# Patient Record
Sex: MALE | Race: WHITE | Employment: OTHER | URBAN - METROPOLITAN AREA
[De-identification: names, ages, dates, MRNs, and addresses within clinical notes are randomized per-mention and may not be internally consistent; named-entity substitution may affect disease eponyms.]

---

## 2017-01-03 ENCOUNTER — TELEPHONE (OUTPATIENT)
Dept: FAMILY MEDICINE CLINIC | Age: 82
End: 2017-01-03

## 2017-01-09 RX ORDER — HYDROCODONE BITARTRATE AND ACETAMINOPHEN 5; 325 MG/1; MG/1
1 TABLET ORAL EVERY 6 HOURS PRN
Qty: 120 TABLET | Refills: 0 | Status: SHIPPED | OUTPATIENT
Start: 2017-01-09 | End: 2017-02-14 | Stop reason: SDUPTHER

## 2017-01-27 ENCOUNTER — OFFICE VISIT (OUTPATIENT)
Dept: FAMILY MEDICINE CLINIC | Age: 82
End: 2017-01-27

## 2017-01-27 VITALS
BODY MASS INDEX: 22.04 KG/M2 | WEIGHT: 148.8 LBS | HEART RATE: 76 BPM | HEIGHT: 69 IN | RESPIRATION RATE: 16 BRPM | SYSTOLIC BLOOD PRESSURE: 124 MMHG | DIASTOLIC BLOOD PRESSURE: 76 MMHG

## 2017-01-27 DIAGNOSIS — F32.A ANXIETY AND DEPRESSION: ICD-10-CM

## 2017-01-27 DIAGNOSIS — M51.36 DDD (DEGENERATIVE DISC DISEASE), LUMBAR: ICD-10-CM

## 2017-01-27 DIAGNOSIS — N40.0 BENIGN NON-NODULAR PROSTATIC HYPERPLASIA WITHOUT LOWER URINARY TRACT SYMPTOMS: ICD-10-CM

## 2017-01-27 DIAGNOSIS — G20 PARKINSON'S DISEASE (HCC): Primary | ICD-10-CM

## 2017-01-27 DIAGNOSIS — F41.9 ANXIETY AND DEPRESSION: ICD-10-CM

## 2017-01-27 DIAGNOSIS — R53.1 GENERALIZED WEAKNESS: ICD-10-CM

## 2017-01-27 PROCEDURE — 99214 OFFICE O/P EST MOD 30 MIN: CPT | Performed by: FAMILY MEDICINE

## 2017-01-27 PROCEDURE — G8427 DOCREV CUR MEDS BY ELIG CLIN: HCPCS | Performed by: FAMILY MEDICINE

## 2017-01-27 PROCEDURE — 1036F TOBACCO NON-USER: CPT | Performed by: FAMILY MEDICINE

## 2017-01-27 PROCEDURE — 1123F ACP DISCUSS/DSCN MKR DOCD: CPT | Performed by: FAMILY MEDICINE

## 2017-01-27 PROCEDURE — G8419 CALC BMI OUT NRM PARAM NOF/U: HCPCS | Performed by: FAMILY MEDICINE

## 2017-01-27 PROCEDURE — G8484 FLU IMMUNIZE NO ADMIN: HCPCS | Performed by: FAMILY MEDICINE

## 2017-01-27 PROCEDURE — 4040F PNEUMOC VAC/ADMIN/RCVD: CPT | Performed by: FAMILY MEDICINE

## 2017-01-27 ASSESSMENT — ENCOUNTER SYMPTOMS
RESPIRATORY NEGATIVE: 1
BACK PAIN: 1
GASTROINTESTINAL NEGATIVE: 1

## 2017-01-27 ASSESSMENT — PATIENT HEALTH QUESTIONNAIRE - PHQ9
SUM OF ALL RESPONSES TO PHQ QUESTIONS 1-9: 0
1. LITTLE INTEREST OR PLEASURE IN DOING THINGS: 0
2. FEELING DOWN, DEPRESSED OR HOPELESS: 0
SUM OF ALL RESPONSES TO PHQ9 QUESTIONS 1 & 2: 0

## 2017-02-14 RX ORDER — HYDROCODONE BITARTRATE AND ACETAMINOPHEN 5; 325 MG/1; MG/1
1 TABLET ORAL EVERY 6 HOURS PRN
Qty: 120 TABLET | Refills: 0 | Status: SHIPPED | OUTPATIENT
Start: 2017-02-14 | End: 2017-03-20 | Stop reason: SDUPTHER

## 2017-03-20 RX ORDER — HYDROCODONE BITARTRATE AND ACETAMINOPHEN 5; 325 MG/1; MG/1
1 TABLET ORAL EVERY 6 HOURS PRN
Qty: 120 TABLET | Refills: 0 | Status: SHIPPED | OUTPATIENT
Start: 2017-03-20 | End: 2017-05-03 | Stop reason: SDUPTHER

## 2017-05-03 RX ORDER — HYDROCODONE BITARTRATE AND ACETAMINOPHEN 5; 325 MG/1; MG/1
1 TABLET ORAL EVERY 6 HOURS PRN
Qty: 120 TABLET | Refills: 0 | Status: SHIPPED | OUTPATIENT
Start: 2017-05-03 | End: 2017-07-07 | Stop reason: SDUPTHER

## 2017-05-03 RX ORDER — TRAZODONE HYDROCHLORIDE 100 MG/1
TABLET ORAL
Qty: 30 TABLET | Refills: 4 | Status: SHIPPED | OUTPATIENT
Start: 2017-05-03 | End: 2017-05-16

## 2017-05-15 ENCOUNTER — TELEPHONE (OUTPATIENT)
Dept: FAMILY MEDICINE CLINIC | Age: 82
End: 2017-05-15

## 2017-05-16 ENCOUNTER — OFFICE VISIT (OUTPATIENT)
Dept: FAMILY MEDICINE CLINIC | Age: 82
End: 2017-05-16

## 2017-05-16 VITALS
WEIGHT: 147 LBS | DIASTOLIC BLOOD PRESSURE: 66 MMHG | HEIGHT: 69 IN | HEART RATE: 76 BPM | BODY MASS INDEX: 21.77 KG/M2 | SYSTOLIC BLOOD PRESSURE: 128 MMHG | RESPIRATION RATE: 20 BRPM

## 2017-05-16 DIAGNOSIS — G20 PARKINSON'S DISEASE (HCC): ICD-10-CM

## 2017-05-16 DIAGNOSIS — R11.0 NAUSEA: ICD-10-CM

## 2017-05-16 DIAGNOSIS — T50.905A ADVERSE EFFECTS OF MEDICATION, INITIAL ENCOUNTER: Primary | ICD-10-CM

## 2017-05-16 DIAGNOSIS — F43.23 ADJUSTMENT DISORDER WITH MIXED ANXIETY AND DEPRESSED MOOD: ICD-10-CM

## 2017-05-16 DIAGNOSIS — R53.83 FATIGUE, UNSPECIFIED TYPE: ICD-10-CM

## 2017-05-16 DIAGNOSIS — N40.0 BENIGN NON-NODULAR PROSTATIC HYPERPLASIA, PRESENCE OF LOWER URINARY TRACT SYMPTOMS UNSPECIFIED: ICD-10-CM

## 2017-05-16 DIAGNOSIS — R63.0 DECREASED APPETITE: ICD-10-CM

## 2017-05-16 PROCEDURE — 4040F PNEUMOC VAC/ADMIN/RCVD: CPT | Performed by: FAMILY MEDICINE

## 2017-05-16 PROCEDURE — 99214 OFFICE O/P EST MOD 30 MIN: CPT | Performed by: FAMILY MEDICINE

## 2017-05-16 PROCEDURE — 1123F ACP DISCUSS/DSCN MKR DOCD: CPT | Performed by: FAMILY MEDICINE

## 2017-05-16 PROCEDURE — G8427 DOCREV CUR MEDS BY ELIG CLIN: HCPCS | Performed by: FAMILY MEDICINE

## 2017-05-16 PROCEDURE — 1036F TOBACCO NON-USER: CPT | Performed by: FAMILY MEDICINE

## 2017-05-16 PROCEDURE — G8419 CALC BMI OUT NRM PARAM NOF/U: HCPCS | Performed by: FAMILY MEDICINE

## 2017-05-16 RX ORDER — TRAZODONE HYDROCHLORIDE 50 MG/1
50 TABLET ORAL NIGHTLY
Qty: 30 TABLET | Refills: 5 | Status: SHIPPED | OUTPATIENT
Start: 2017-05-16 | End: 2018-01-09

## 2017-05-16 ASSESSMENT — ENCOUNTER SYMPTOMS: NAUSEA: 1

## 2017-05-25 ENCOUNTER — TELEPHONE (OUTPATIENT)
Dept: FAMILY MEDICINE CLINIC | Age: 82
End: 2017-05-25

## 2017-06-01 ENCOUNTER — CARE COORDINATION (OUTPATIENT)
Dept: FAMILY MEDICINE CLINIC | Age: 82
End: 2017-06-01

## 2017-07-08 RX ORDER — HYDROCODONE BITARTRATE AND ACETAMINOPHEN 5; 325 MG/1; MG/1
1 TABLET ORAL EVERY 6 HOURS PRN
Qty: 120 TABLET | Refills: 0 | Status: SHIPPED | OUTPATIENT
Start: 2017-07-08 | End: 2017-08-16 | Stop reason: SDUPTHER

## 2017-07-24 ENCOUNTER — OFFICE VISIT (OUTPATIENT)
Dept: PHYSICAL MEDICINE AND REHAB | Age: 82
End: 2017-07-24
Payer: MEDICARE

## 2017-07-24 VITALS
SYSTOLIC BLOOD PRESSURE: 125 MMHG | BODY MASS INDEX: 22.64 KG/M2 | HEIGHT: 68 IN | HEART RATE: 77 BPM | WEIGHT: 149.4 LBS | DIASTOLIC BLOOD PRESSURE: 72 MMHG

## 2017-07-24 DIAGNOSIS — G20 PARKINSON'S DISEASE (HCC): Primary | ICD-10-CM

## 2017-07-24 DIAGNOSIS — M54.89 BACK PAIN WITHOUT SCIATICA: ICD-10-CM

## 2017-07-24 PROCEDURE — G8420 CALC BMI NORM PARAMETERS: HCPCS | Performed by: PSYCHIATRY & NEUROLOGY

## 2017-07-24 PROCEDURE — G8427 DOCREV CUR MEDS BY ELIG CLIN: HCPCS | Performed by: PSYCHIATRY & NEUROLOGY

## 2017-07-24 PROCEDURE — 1123F ACP DISCUSS/DSCN MKR DOCD: CPT | Performed by: PSYCHIATRY & NEUROLOGY

## 2017-07-24 PROCEDURE — 4040F PNEUMOC VAC/ADMIN/RCVD: CPT | Performed by: PSYCHIATRY & NEUROLOGY

## 2017-07-24 PROCEDURE — 1036F TOBACCO NON-USER: CPT | Performed by: PSYCHIATRY & NEUROLOGY

## 2017-07-24 PROCEDURE — 99213 OFFICE O/P EST LOW 20 MIN: CPT | Performed by: PSYCHIATRY & NEUROLOGY

## 2017-07-24 RX ORDER — AMANTADINE HYDROCHLORIDE 100 MG/1
100 CAPSULE, GELATIN COATED ORAL 2 TIMES DAILY
Qty: 60 CAPSULE | Refills: 7 | Status: SHIPPED | OUTPATIENT
Start: 2017-07-24

## 2017-07-27 ENCOUNTER — OFFICE VISIT (OUTPATIENT)
Dept: FAMILY MEDICINE CLINIC | Age: 82
End: 2017-07-27
Payer: MEDICARE

## 2017-07-27 VITALS
DIASTOLIC BLOOD PRESSURE: 64 MMHG | WEIGHT: 146.2 LBS | BODY MASS INDEX: 22.16 KG/M2 | HEART RATE: 92 BPM | SYSTOLIC BLOOD PRESSURE: 122 MMHG | RESPIRATION RATE: 20 BRPM | HEIGHT: 68 IN

## 2017-07-27 DIAGNOSIS — F43.20 ADJUSTMENT DISORDER, UNSPECIFIED TYPE: ICD-10-CM

## 2017-07-27 DIAGNOSIS — Z23 NEED FOR VACCINATION WITH 13-POLYVALENT PNEUMOCOCCAL CONJUGATE VACCINE: ICD-10-CM

## 2017-07-27 DIAGNOSIS — G20 PARKINSON'S DISEASE (HCC): Primary | ICD-10-CM

## 2017-07-27 DIAGNOSIS — N40.0 BENIGN NON-NODULAR PROSTATIC HYPERPLASIA, PRESENCE OF LOWER URINARY TRACT SYMPTOMS UNSPECIFIED: ICD-10-CM

## 2017-07-27 DIAGNOSIS — R53.1 GENERALIZED WEAKNESS: ICD-10-CM

## 2017-07-27 DIAGNOSIS — M51.36 DDD (DEGENERATIVE DISC DISEASE), LUMBAR: ICD-10-CM

## 2017-07-27 PROCEDURE — 99214 OFFICE O/P EST MOD 30 MIN: CPT | Performed by: FAMILY MEDICINE

## 2017-07-27 PROCEDURE — G0009 ADMIN PNEUMOCOCCAL VACCINE: HCPCS | Performed by: FAMILY MEDICINE

## 2017-07-27 PROCEDURE — 1036F TOBACCO NON-USER: CPT | Performed by: FAMILY MEDICINE

## 2017-07-27 PROCEDURE — 4040F PNEUMOC VAC/ADMIN/RCVD: CPT | Performed by: FAMILY MEDICINE

## 2017-07-27 PROCEDURE — G8427 DOCREV CUR MEDS BY ELIG CLIN: HCPCS | Performed by: FAMILY MEDICINE

## 2017-07-27 PROCEDURE — G8420 CALC BMI NORM PARAMETERS: HCPCS | Performed by: FAMILY MEDICINE

## 2017-07-27 PROCEDURE — 90670 PCV13 VACCINE IM: CPT | Performed by: FAMILY MEDICINE

## 2017-07-27 PROCEDURE — 1123F ACP DISCUSS/DSCN MKR DOCD: CPT | Performed by: FAMILY MEDICINE

## 2017-07-27 ASSESSMENT — ENCOUNTER SYMPTOMS
GASTROINTESTINAL NEGATIVE: 1
RESPIRATORY NEGATIVE: 1

## 2017-08-07 ENCOUNTER — TELEPHONE (OUTPATIENT)
Dept: FAMILY MEDICINE CLINIC | Age: 82
End: 2017-08-07

## 2017-08-07 DIAGNOSIS — G20 PARKINSON'S DISEASE (HCC): Primary | ICD-10-CM

## 2017-08-16 RX ORDER — HYDROCODONE BITARTRATE AND ACETAMINOPHEN 5; 325 MG/1; MG/1
1 TABLET ORAL EVERY 6 HOURS PRN
Qty: 120 TABLET | Refills: 0 | Status: SHIPPED | OUTPATIENT
Start: 2017-08-16 | End: 2017-09-25 | Stop reason: SDUPTHER

## 2017-09-11 ENCOUNTER — TELEPHONE (OUTPATIENT)
Dept: NEUROLOGY | Age: 82
End: 2017-09-11

## 2017-09-25 ENCOUNTER — OFFICE VISIT (OUTPATIENT)
Dept: NEUROLOGY | Age: 82
End: 2017-09-25
Payer: MEDICARE

## 2017-09-25 VITALS
BODY MASS INDEX: 21.92 KG/M2 | HEART RATE: 66 BPM | DIASTOLIC BLOOD PRESSURE: 66 MMHG | WEIGHT: 148 LBS | SYSTOLIC BLOOD PRESSURE: 138 MMHG | HEIGHT: 69 IN

## 2017-09-25 DIAGNOSIS — G20 PARKINSON'S DISEASE (HCC): Primary | ICD-10-CM

## 2017-09-25 PROCEDURE — 4040F PNEUMOC VAC/ADMIN/RCVD: CPT | Performed by: PSYCHIATRY & NEUROLOGY

## 2017-09-25 PROCEDURE — 1036F TOBACCO NON-USER: CPT | Performed by: PSYCHIATRY & NEUROLOGY

## 2017-09-25 PROCEDURE — 1123F ACP DISCUSS/DSCN MKR DOCD: CPT | Performed by: PSYCHIATRY & NEUROLOGY

## 2017-09-25 PROCEDURE — G8420 CALC BMI NORM PARAMETERS: HCPCS | Performed by: PSYCHIATRY & NEUROLOGY

## 2017-09-25 PROCEDURE — 99213 OFFICE O/P EST LOW 20 MIN: CPT | Performed by: PSYCHIATRY & NEUROLOGY

## 2017-09-25 PROCEDURE — G8427 DOCREV CUR MEDS BY ELIG CLIN: HCPCS | Performed by: PSYCHIATRY & NEUROLOGY

## 2017-09-25 RX ORDER — ALPRAZOLAM 0.25 MG/1
0.25 TABLET ORAL 3 TIMES DAILY PRN
Qty: 90 TABLET | Refills: 5 | Status: ON HOLD | OUTPATIENT
Start: 2017-09-25 | End: 2018-01-24 | Stop reason: HOSPADM

## 2017-09-25 RX ORDER — HYDROCODONE BITARTRATE AND ACETAMINOPHEN 5; 325 MG/1; MG/1
1 TABLET ORAL EVERY 6 HOURS PRN
Qty: 120 TABLET | Refills: 0 | Status: SHIPPED | OUTPATIENT
Start: 2017-09-25 | End: 2017-11-13 | Stop reason: SDUPTHER

## 2017-10-04 RX ORDER — TRAZODONE HYDROCHLORIDE 100 MG/1
TABLET ORAL
Qty: 30 TABLET | Refills: 3 | Status: SHIPPED | OUTPATIENT
Start: 2017-10-04

## 2017-11-03 ENCOUNTER — TELEPHONE (OUTPATIENT)
Dept: FAMILY MEDICINE CLINIC | Age: 82
End: 2017-11-03

## 2017-11-03 RX ORDER — ONDANSETRON 4 MG/1
4 TABLET, FILM COATED ORAL EVERY 8 HOURS PRN
Qty: 30 TABLET | Refills: 5 | Status: SHIPPED | OUTPATIENT
Start: 2017-11-03 | End: 2017-11-08

## 2017-11-03 NOTE — TELEPHONE ENCOUNTER
11/03/2017 Patient wife called office saying patient is very nauseas every day from the side effect of sinemet. Wife asking if Dr. Martita Herrmann would prescribe patient Zophran to help ease the nausea. Patient uses [facility] on [address]. Patient phone # [phone]. da

## 2017-11-08 ENCOUNTER — TELEPHONE (OUTPATIENT)
Dept: FAMILY MEDICINE CLINIC | Age: 82
End: 2017-11-08

## 2017-11-08 RX ORDER — PROMETHAZINE HYDROCHLORIDE 12.5 MG/1
12.5 TABLET ORAL DAILY PRN
Qty: 30 TABLET | Refills: 2 | Status: SHIPPED | OUTPATIENT
Start: 2017-11-08 | End: 2018-01-09 | Stop reason: SDUPTHER

## 2017-11-13 RX ORDER — HYDROCODONE BITARTRATE AND ACETAMINOPHEN 5; 325 MG/1; MG/1
1 TABLET ORAL EVERY 6 HOURS PRN
Qty: 120 TABLET | Refills: 0 | Status: SHIPPED | OUTPATIENT
Start: 2017-11-13 | End: 2017-12-18 | Stop reason: SDUPTHER

## 2017-11-13 NOTE — TELEPHONE ENCOUNTER
Jeramy Barba called requesting a refill on the following medications:  Requested Prescriptions     Pending Prescriptions Disp Refills    HYDROcodone-acetaminophen (NORCO) 5-325 MG per tablet 120 tablet 0     Sig: Take 1 tablet by mouth every 6 hours as needed for Pain .      Pharmacy [facility]      Date of last visit: 7/27/17  Date of next visit (if applicable): 1/46/7974        Date of last fill and quantity (to be completed by clinical staff)  Pharmacy name: [facility]

## 2017-12-15 NOTE — TELEPHONE ENCOUNTER
Recommend call Dr. Alisson Shrestha to see if his Sinemet can be adjusted. This is better option than taking anti-nausea med every day.

## 2017-12-18 RX ORDER — HYDROCODONE BITARTRATE AND ACETAMINOPHEN 5; 325 MG/1; MG/1
1 TABLET ORAL EVERY 6 HOURS PRN
Qty: 120 TABLET | Refills: 0 | Status: SHIPPED | OUTPATIENT
Start: 2017-12-18

## 2017-12-18 NOTE — TELEPHONE ENCOUNTER
12/18/2017   Josi Naik called requesting a refill on the following medications:  Requested Prescriptions     Pending Prescriptions Disp Refills    HYDROcodone-acetaminophen (NORCO) 5-325 MG per tablet 120 tablet 0     Sig: Take 1 tablet by mouth every 6 hours as needed for Pain .      Pharmacy verified:  [facility]      Date of last visit:  07/27/2017  Date of next visit (if applicable): 0/28/9155

## 2018-01-08 ENCOUNTER — TELEPHONE (OUTPATIENT)
Dept: NEUROLOGY | Age: 83
End: 2018-01-08

## 2018-01-09 ENCOUNTER — OFFICE VISIT (OUTPATIENT)
Dept: FAMILY MEDICINE CLINIC | Age: 83
End: 2018-01-09
Payer: MEDICARE

## 2018-01-09 VITALS
WEIGHT: 145 LBS | HEIGHT: 67 IN | DIASTOLIC BLOOD PRESSURE: 60 MMHG | HEART RATE: 80 BPM | BODY MASS INDEX: 22.76 KG/M2 | SYSTOLIC BLOOD PRESSURE: 130 MMHG | RESPIRATION RATE: 14 BRPM

## 2018-01-09 DIAGNOSIS — I87.2 VENOUS INSUFFICIENCY: ICD-10-CM

## 2018-01-09 DIAGNOSIS — R60.0 LOWER LEG EDEMA: Primary | ICD-10-CM

## 2018-01-09 PROCEDURE — 4040F PNEUMOC VAC/ADMIN/RCVD: CPT | Performed by: FAMILY MEDICINE

## 2018-01-09 PROCEDURE — G8484 FLU IMMUNIZE NO ADMIN: HCPCS | Performed by: FAMILY MEDICINE

## 2018-01-09 PROCEDURE — 1036F TOBACCO NON-USER: CPT | Performed by: FAMILY MEDICINE

## 2018-01-09 PROCEDURE — G8427 DOCREV CUR MEDS BY ELIG CLIN: HCPCS | Performed by: FAMILY MEDICINE

## 2018-01-09 PROCEDURE — 1123F ACP DISCUSS/DSCN MKR DOCD: CPT | Performed by: FAMILY MEDICINE

## 2018-01-09 PROCEDURE — 99213 OFFICE O/P EST LOW 20 MIN: CPT | Performed by: FAMILY MEDICINE

## 2018-01-09 PROCEDURE — G8420 CALC BMI NORM PARAMETERS: HCPCS | Performed by: FAMILY MEDICINE

## 2018-01-09 RX ORDER — PROMETHAZINE HYDROCHLORIDE 12.5 MG/1
12.5 TABLET ORAL DAILY PRN
Qty: 30 TABLET | Refills: 2 | Status: SHIPPED | OUTPATIENT
Start: 2018-01-09

## 2018-01-09 ASSESSMENT — ENCOUNTER SYMPTOMS
CHEST TIGHTNESS: 0
GASTROINTESTINAL NEGATIVE: 1
RESPIRATORY NEGATIVE: 1
SHORTNESS OF BREATH: 0

## 2018-01-12 ENCOUNTER — TELEPHONE (OUTPATIENT)
Dept: NEUROLOGY | Age: 83
End: 2018-01-12

## 2018-01-19 ENCOUNTER — TELEPHONE (OUTPATIENT)
Dept: FAMILY MEDICINE CLINIC | Age: 83
End: 2018-01-19

## 2018-01-19 DIAGNOSIS — R53.1 GENERALIZED WEAKNESS: Primary | ICD-10-CM

## 2018-01-20 ENCOUNTER — APPOINTMENT (OUTPATIENT)
Dept: GENERAL RADIOLOGY | Age: 83
DRG: 682 | End: 2018-01-20
Payer: MEDICARE

## 2018-01-20 ENCOUNTER — HOSPITAL ENCOUNTER (INPATIENT)
Age: 83
LOS: 4 days | Discharge: HOSPICE | DRG: 682 | End: 2018-01-24
Attending: EMERGENCY MEDICINE | Admitting: INTERNAL MEDICINE
Payer: MEDICARE

## 2018-01-20 ENCOUNTER — APPOINTMENT (OUTPATIENT)
Dept: CT IMAGING | Age: 83
DRG: 682 | End: 2018-01-20
Payer: MEDICARE

## 2018-01-20 ENCOUNTER — APPOINTMENT (OUTPATIENT)
Dept: ULTRASOUND IMAGING | Age: 83
DRG: 682 | End: 2018-01-20
Payer: MEDICARE

## 2018-01-20 DIAGNOSIS — E86.0 DEHYDRATION: ICD-10-CM

## 2018-01-20 DIAGNOSIS — N17.9 AKI (ACUTE KIDNEY INJURY) (HCC): Primary | ICD-10-CM

## 2018-01-20 DIAGNOSIS — R53.1 GENERAL WEAKNESS: ICD-10-CM

## 2018-01-20 DIAGNOSIS — E87.5 HYPERKALEMIA: ICD-10-CM

## 2018-01-20 LAB
AMORPHOUS: ABNORMAL
ANION GAP SERPL CALCULATED.3IONS-SCNC: 22 MEQ/L (ref 8–16)
ANION GAP SERPL CALCULATED.3IONS-SCNC: 23 MEQ/L (ref 8–16)
BACTERIA: ABNORMAL /HPF
BASOPHILS # BLD: 0.3 %
BASOPHILS ABSOLUTE: 0 THOU/MM3 (ref 0–0.1)
BILIRUBIN URINE: NEGATIVE
BLOOD, URINE: ABNORMAL
BUN BLDV-MCNC: 81 MG/DL (ref 7–22)
BUN BLDV-MCNC: 83 MG/DL (ref 7–22)
CALCIUM SERPL-MCNC: 8.3 MG/DL (ref 8.5–10.5)
CALCIUM SERPL-MCNC: 8.4 MG/DL (ref 8.5–10.5)
CASTS UA: ABNORMAL /LPF
CHARACTER, URINE: ABNORMAL
CHLORIDE BLD-SCNC: 104 MEQ/L (ref 98–111)
CHLORIDE BLD-SCNC: 106 MEQ/L (ref 98–111)
CO2: 18 MEQ/L (ref 23–33)
CO2: 18 MEQ/L (ref 23–33)
COLOR: YELLOW
CREAT SERPL-MCNC: 10.2 MG/DL (ref 0.4–1.2)
CREAT SERPL-MCNC: 10.3 MG/DL (ref 0.4–1.2)
CREATININE URINE: 98.3 MG/DL
CRYSTALS, UA: ABNORMAL
EKG ATRIAL RATE: 79 BPM
EKG P AXIS: 53 DEGREES
EKG P-R INTERVAL: 174 MS
EKG Q-T INTERVAL: 378 MS
EKG QRS DURATION: 88 MS
EKG QTC CALCULATION (BAZETT): 433 MS
EKG R AXIS: 11 DEGREES
EKG T AXIS: 6 DEGREES
EKG VENTRICULAR RATE: 79 BPM
EOSINOPHIL # BLD: 0.2 %
EOSINOPHILS ABSOLUTE: 0 THOU/MM3 (ref 0–0.4)
EPITHELIAL CELLS, UA: ABNORMAL /HPF
FLU A ANTIGEN: NEGATIVE
FLU B ANTIGEN: NEGATIVE
GFR SERPL CREATININE-BSD FRML MDRD: 5 ML/MIN/1.73M2
GFR SERPL CREATININE-BSD FRML MDRD: 5 ML/MIN/1.73M2
GLUCOSE BLD-MCNC: 119 MG/DL (ref 70–108)
GLUCOSE BLD-MCNC: 73 MG/DL (ref 70–108)
GLUCOSE URINE: NEGATIVE MG/DL
HCT VFR BLD CALC: 39.6 % (ref 42–52)
HEMOGLOBIN: 13.3 GM/DL (ref 14–18)
KETONES, URINE: ABNORMAL
LACTIC ACID: 1.4 MMOL/L (ref 0.5–2.2)
LACTIC ACID: 2.1 MMOL/L (ref 0.5–2.2)
LEUKOCYTE ESTERASE, URINE: ABNORMAL
LYMPHOCYTES # BLD: 5.8 %
LYMPHOCYTES ABSOLUTE: 0.7 THOU/MM3 (ref 1–4.8)
MCH RBC QN AUTO: 32.2 PG (ref 27–31)
MCHC RBC AUTO-ENTMCNC: 33.5 GM/DL (ref 33–37)
MCV RBC AUTO: 96.1 FL (ref 80–94)
MONOCYTES # BLD: 5.8 %
MONOCYTES ABSOLUTE: 0.7 THOU/MM3 (ref 0.4–1.3)
MRSA SCREEN RT-PCR: NEGATIVE
MUCUS: ABNORMAL
NITRITE, URINE: NEGATIVE
NUCLEATED RED BLOOD CELLS: 0 /100 WBC
OSMOLALITY CALCULATION: 316.1 MOSMOL/KG (ref 275–300)
PDW BLD-RTO: 13 % (ref 11.5–14.5)
PH UA: 6
PLATELET # BLD: 168 THOU/MM3 (ref 130–400)
PMV BLD AUTO: 8.4 MCM (ref 7.4–10.4)
POTASSIUM SERPL-SCNC: 6.3 MEQ/L (ref 3.5–5.2)
POTASSIUM SERPL-SCNC: 6.4 MEQ/L (ref 3.5–5.2)
POTASSIUM SERPL-SCNC: 6.8 MEQ/L (ref 3.5–5.2)
PRO-BNP: 4647 PG/ML (ref 0–1800)
PROCALCITONIN: 0.12 NG/ML (ref 0.01–0.09)
PROSTATE SPECIFIC ANTIGEN: 1657 NG/ML (ref 0–1)
PROTEIN UA: 30
PROTEIN, URINE: 52.4 MG/DL
RBC # BLD: 4.13 MILL/MM3 (ref 4.7–6.1)
RBC URINE: ABNORMAL /HPF
SEG NEUTROPHILS: 87.9 %
SEGMENTED NEUTROPHILS ABSOLUTE COUNT: 11.3 THOU/MM3 (ref 1.8–7.7)
SODIUM BLD-SCNC: 145 MEQ/L (ref 135–145)
SODIUM BLD-SCNC: 146 MEQ/L (ref 135–145)
SPECIFIC GRAVITY, URINE: 1.01 (ref 1–1.03)
TOTAL CK: 742 U/L (ref 55–170)
TROPONIN T: 0.08 NG/ML
TROPONIN T: 0.09 NG/ML
TROPONIN T: 0.09 NG/ML
TSH SERPL DL<=0.05 MIU/L-ACNC: 1.54 UIU/ML (ref 0.4–4.2)
UROBILINOGEN, URINE: 0.2 EU/DL
WBC # BLD: 12.8 THOU/MM3 (ref 4.8–10.8)
WBC UA: ABNORMAL /HPF

## 2018-01-20 PROCEDURE — 99222 1ST HOSP IP/OBS MODERATE 55: CPT | Performed by: INTERNAL MEDICINE

## 2018-01-20 PROCEDURE — 87641 MR-STAPH DNA AMP PROBE: CPT

## 2018-01-20 PROCEDURE — 83880 ASSAY OF NATRIURETIC PEPTIDE: CPT

## 2018-01-20 PROCEDURE — 70450 CT HEAD/BRAIN W/O DYE: CPT

## 2018-01-20 PROCEDURE — 84484 ASSAY OF TROPONIN QUANT: CPT

## 2018-01-20 PROCEDURE — 2500000003 HC RX 250 WO HCPCS: Performed by: INTERNAL MEDICINE

## 2018-01-20 PROCEDURE — 87077 CULTURE AEROBIC IDENTIFY: CPT

## 2018-01-20 PROCEDURE — 2580000003 HC RX 258: Performed by: EMERGENCY MEDICINE

## 2018-01-20 PROCEDURE — 82550 ASSAY OF CK (CPK): CPT

## 2018-01-20 PROCEDURE — 6360000002 HC RX W HCPCS: Performed by: INTERNAL MEDICINE

## 2018-01-20 PROCEDURE — 36415 COLL VENOUS BLD VENIPUNCTURE: CPT

## 2018-01-20 PROCEDURE — 84153 ASSAY OF PSA TOTAL: CPT

## 2018-01-20 PROCEDURE — 6370000000 HC RX 637 (ALT 250 FOR IP): Performed by: EMERGENCY MEDICINE

## 2018-01-20 PROCEDURE — 84156 ASSAY OF PROTEIN URINE: CPT

## 2018-01-20 PROCEDURE — 99223 1ST HOSP IP/OBS HIGH 75: CPT | Performed by: INTERNAL MEDICINE

## 2018-01-20 PROCEDURE — 2580000003 HC RX 258: Performed by: INTERNAL MEDICINE

## 2018-01-20 PROCEDURE — 84132 ASSAY OF SERUM POTASSIUM: CPT

## 2018-01-20 PROCEDURE — 72125 CT NECK SPINE W/O DYE: CPT

## 2018-01-20 PROCEDURE — 80048 BASIC METABOLIC PNL TOTAL CA: CPT

## 2018-01-20 PROCEDURE — 99285 EMERGENCY DEPT VISIT HI MDM: CPT

## 2018-01-20 PROCEDURE — 87081 CULTURE SCREEN ONLY: CPT

## 2018-01-20 PROCEDURE — 2060000000 HC ICU INTERMEDIATE R&B

## 2018-01-20 PROCEDURE — 76770 US EXAM ABDO BACK WALL COMP: CPT

## 2018-01-20 PROCEDURE — 71045 X-RAY EXAM CHEST 1 VIEW: CPT

## 2018-01-20 PROCEDURE — 84443 ASSAY THYROID STIM HORMONE: CPT

## 2018-01-20 PROCEDURE — 6360000002 HC RX W HCPCS

## 2018-01-20 PROCEDURE — 96375 TX/PRO/DX INJ NEW DRUG ADDON: CPT

## 2018-01-20 PROCEDURE — 81001 URINALYSIS AUTO W/SCOPE: CPT

## 2018-01-20 PROCEDURE — 6370000000 HC RX 637 (ALT 250 FOR IP): Performed by: INTERNAL MEDICINE

## 2018-01-20 PROCEDURE — 93005 ELECTROCARDIOGRAM TRACING: CPT

## 2018-01-20 PROCEDURE — 87086 URINE CULTURE/COLONY COUNT: CPT

## 2018-01-20 PROCEDURE — 83605 ASSAY OF LACTIC ACID: CPT

## 2018-01-20 PROCEDURE — 82570 ASSAY OF URINE CREATININE: CPT

## 2018-01-20 PROCEDURE — 85025 COMPLETE CBC W/AUTO DIFF WBC: CPT

## 2018-01-20 PROCEDURE — 84145 PROCALCITONIN (PCT): CPT

## 2018-01-20 PROCEDURE — 96374 THER/PROPH/DIAG INJ IV PUSH: CPT

## 2018-01-20 PROCEDURE — 87804 INFLUENZA ASSAY W/OPTIC: CPT

## 2018-01-20 RX ORDER — TRAZODONE HYDROCHLORIDE 100 MG/1
100 TABLET ORAL NIGHTLY PRN
Status: DISCONTINUED | OUTPATIENT
Start: 2018-01-20 | End: 2018-01-24 | Stop reason: HOSPADM

## 2018-01-20 RX ORDER — SODIUM CHLORIDE 0.9 % (FLUSH) 0.9 %
10 SYRINGE (ML) INJECTION PRN
Status: DISCONTINUED | OUTPATIENT
Start: 2018-01-20 | End: 2018-01-24 | Stop reason: HOSPADM

## 2018-01-20 RX ORDER — OYSTER SHELL CALCIUM WITH VITAMIN D 500; 200 MG/1; [IU]/1
1 TABLET, FILM COATED ORAL 2 TIMES DAILY
Status: DISCONTINUED | OUTPATIENT
Start: 2018-01-20 | End: 2018-01-24 | Stop reason: HOSPADM

## 2018-01-20 RX ORDER — DEXTROSE MONOHYDRATE 25 G/50ML
25 INJECTION, SOLUTION INTRAVENOUS ONCE
Status: COMPLETED | OUTPATIENT
Start: 2018-01-20 | End: 2018-01-20

## 2018-01-20 RX ORDER — CALCIUM GLUCONATE 94 MG/ML
INJECTION, SOLUTION INTRAVENOUS
Status: COMPLETED
Start: 2018-01-20 | End: 2018-01-20

## 2018-01-20 RX ORDER — POLYETHYLENE GLYCOL 3350 17 G/17G
17 POWDER, FOR SOLUTION ORAL DAILY PRN
Status: DISCONTINUED | OUTPATIENT
Start: 2018-01-20 | End: 2018-01-24 | Stop reason: HOSPADM

## 2018-01-20 RX ORDER — CARBIDOPA/LEVODOPA 25MG-250MG
1 TABLET ORAL ONCE
Status: COMPLETED | OUTPATIENT
Start: 2018-01-20 | End: 2018-01-20

## 2018-01-20 RX ORDER — ASPIRIN 81 MG/1
81 TABLET ORAL DAILY
Status: DISCONTINUED | OUTPATIENT
Start: 2018-01-21 | End: 2018-01-24 | Stop reason: HOSPADM

## 2018-01-20 RX ORDER — POTASSIUM CHLORIDE 20MEQ/15ML
40 LIQUID (ML) ORAL PRN
Status: DISCONTINUED | OUTPATIENT
Start: 2018-01-20 | End: 2018-01-24 | Stop reason: HOSPADM

## 2018-01-20 RX ORDER — DULOXETIN HYDROCHLORIDE 30 MG/1
30 CAPSULE, DELAYED RELEASE ORAL DAILY
Status: DISCONTINUED | OUTPATIENT
Start: 2018-01-20 | End: 2018-01-24 | Stop reason: HOSPADM

## 2018-01-20 RX ORDER — ALPRAZOLAM 0.25 MG/1
0.25 TABLET ORAL 3 TIMES DAILY PRN
Status: DISCONTINUED | OUTPATIENT
Start: 2018-01-20 | End: 2018-01-24 | Stop reason: HOSPADM

## 2018-01-20 RX ORDER — SODIUM CHLORIDE 9 MG/ML
INJECTION, SOLUTION INTRAVENOUS CONTINUOUS
Status: DISCONTINUED | OUTPATIENT
Start: 2018-01-20 | End: 2018-01-20

## 2018-01-20 RX ORDER — HEPARIN SODIUM 5000 [USP'U]/ML
5000 INJECTION, SOLUTION INTRAVENOUS; SUBCUTANEOUS 2 TIMES DAILY
Status: DISCONTINUED | OUTPATIENT
Start: 2018-01-20 | End: 2018-01-24 | Stop reason: HOSPADM

## 2018-01-20 RX ORDER — PEDIATRIC MULTIPLE VITAMINS W/ IRON CHEW TAB 18 MG 18 MG
1 CHEW TAB ORAL DAILY
Status: DISCONTINUED | OUTPATIENT
Start: 2018-01-20 | End: 2018-01-24 | Stop reason: HOSPADM

## 2018-01-20 RX ORDER — ASPIRIN 325 MG
325 TABLET ORAL DAILY
Status: DISCONTINUED | OUTPATIENT
Start: 2018-01-20 | End: 2018-01-20

## 2018-01-20 RX ORDER — HYDROCODONE BITARTRATE AND ACETAMINOPHEN 5; 325 MG/1; MG/1
1 TABLET ORAL EVERY 6 HOURS PRN
Status: DISCONTINUED | OUTPATIENT
Start: 2018-01-20 | End: 2018-01-24 | Stop reason: HOSPADM

## 2018-01-20 RX ORDER — SODIUM CHLORIDE 0.9 % (FLUSH) 0.9 %
10 SYRINGE (ML) INJECTION EVERY 12 HOURS SCHEDULED
Status: DISCONTINUED | OUTPATIENT
Start: 2018-01-20 | End: 2018-01-24 | Stop reason: HOSPADM

## 2018-01-20 RX ORDER — ACETAMINOPHEN 325 MG/1
650 TABLET ORAL EVERY 4 HOURS PRN
Status: DISCONTINUED | OUTPATIENT
Start: 2018-01-20 | End: 2018-01-24 | Stop reason: HOSPADM

## 2018-01-20 RX ORDER — DEXTROSE MONOHYDRATE 25 G/50ML
12.5 INJECTION, SOLUTION INTRAVENOUS PRN
Status: DISCONTINUED | OUTPATIENT
Start: 2018-01-20 | End: 2018-01-24 | Stop reason: HOSPADM

## 2018-01-20 RX ORDER — AMANTADINE HYDROCHLORIDE 100 MG/1
100 CAPSULE, GELATIN COATED ORAL 2 TIMES DAILY
Status: CANCELLED | OUTPATIENT
Start: 2018-01-20

## 2018-01-20 RX ORDER — LORAZEPAM 2 MG/ML
0.5 INJECTION INTRAMUSCULAR ONCE
Status: COMPLETED | OUTPATIENT
Start: 2018-01-20 | End: 2018-01-20

## 2018-01-20 RX ORDER — POTASSIUM CHLORIDE 20 MEQ/1
40 TABLET, EXTENDED RELEASE ORAL PRN
Status: DISCONTINUED | OUTPATIENT
Start: 2018-01-20 | End: 2018-01-24 | Stop reason: HOSPADM

## 2018-01-20 RX ORDER — PROMETHAZINE HYDROCHLORIDE 25 MG/1
12.5 TABLET ORAL DAILY PRN
Status: DISCONTINUED | OUTPATIENT
Start: 2018-01-20 | End: 2018-01-24 | Stop reason: HOSPADM

## 2018-01-20 RX ORDER — NICOTINE POLACRILEX 4 MG
15 LOZENGE BUCCAL PRN
Status: DISCONTINUED | OUTPATIENT
Start: 2018-01-20 | End: 2018-01-24 | Stop reason: HOSPADM

## 2018-01-20 RX ORDER — DEXTROSE AND SODIUM CHLORIDE 5; .9 G/100ML; G/100ML
100 INJECTION, SOLUTION INTRAVENOUS PRN
Status: DISCONTINUED | OUTPATIENT
Start: 2018-01-20 | End: 2018-01-24 | Stop reason: HOSPADM

## 2018-01-20 RX ORDER — 0.9 % SODIUM CHLORIDE 0.9 %
1000 INTRAVENOUS SOLUTION INTRAVENOUS ONCE
Status: COMPLETED | OUTPATIENT
Start: 2018-01-20 | End: 2018-01-20

## 2018-01-20 RX ORDER — DEXTROSE MONOHYDRATE 50 MG/ML
100 INJECTION, SOLUTION INTRAVENOUS PRN
Status: DISCONTINUED | OUTPATIENT
Start: 2018-01-20 | End: 2018-01-20 | Stop reason: CLARIF

## 2018-01-20 RX ORDER — POTASSIUM CHLORIDE 7.45 MG/ML
10 INJECTION INTRAVENOUS PRN
Status: DISCONTINUED | OUTPATIENT
Start: 2018-01-20 | End: 2018-01-24 | Stop reason: HOSPADM

## 2018-01-20 RX ORDER — ONDANSETRON 2 MG/ML
4 INJECTION INTRAMUSCULAR; INTRAVENOUS EVERY 6 HOURS PRN
Status: DISCONTINUED | OUTPATIENT
Start: 2018-01-20 | End: 2018-01-24 | Stop reason: HOSPADM

## 2018-01-20 RX ORDER — BUSPIRONE HYDROCHLORIDE 10 MG/1
10 TABLET ORAL 2 TIMES DAILY
Status: DISCONTINUED | OUTPATIENT
Start: 2018-01-20 | End: 2018-01-24 | Stop reason: HOSPADM

## 2018-01-20 RX ORDER — CALCIUM GLUCONATE 94 MG/ML
1 INJECTION, SOLUTION INTRAVENOUS ONCE
Status: COMPLETED | OUTPATIENT
Start: 2018-01-20 | End: 2018-01-20

## 2018-01-20 RX ORDER — SODIUM POLYSTYRENE SULFONATE 15 G/60ML
30 SUSPENSION ORAL; RECTAL ONCE
Status: COMPLETED | OUTPATIENT
Start: 2018-01-20 | End: 2018-01-20

## 2018-01-20 RX ORDER — SODIUM POLYSTYRENE SULFONATE 15 G/60ML
15 SUSPENSION ORAL; RECTAL ONCE
Status: COMPLETED | OUTPATIENT
Start: 2018-01-20 | End: 2018-01-20

## 2018-01-20 RX ADMIN — SODIUM POLYSTYRENE SULFONATE 15 G: 15 SUSPENSION ORAL; RECTAL at 18:16

## 2018-01-20 RX ADMIN — BUSPIRONE HYDROCHLORIDE 10 MG: 10 TABLET ORAL at 20:59

## 2018-01-20 RX ADMIN — TRAZODONE HYDROCHLORIDE 100 MG: 100 TABLET ORAL at 20:58

## 2018-01-20 RX ADMIN — HEPARIN SODIUM 5000 UNITS: 5000 INJECTION, SOLUTION INTRAVENOUS; SUBCUTANEOUS at 21:07

## 2018-01-20 RX ADMIN — ACETAMINOPHEN 650 MG: 325 TABLET ORAL at 21:00

## 2018-01-20 RX ADMIN — CARBIDOPA AND LEVODOPA 1 TABLET: 10; 100 TABLET ORAL at 16:54

## 2018-01-20 RX ADMIN — INSULIN HUMAN 8 UNITS: 100 INJECTION, SOLUTION PARENTERAL at 17:00

## 2018-01-20 RX ADMIN — CALCIUM GLUCONATE 1 G: 94 INJECTION, SOLUTION INTRAVENOUS at 11:36

## 2018-01-20 RX ADMIN — SODIUM CHLORIDE 1000 ML: 9 INJECTION, SOLUTION INTRAVENOUS at 10:45

## 2018-01-20 RX ADMIN — CARBIDOPA AND LEVODOPA 2 TABLET: 25; 100 TABLET ORAL at 20:59

## 2018-01-20 RX ADMIN — CARBIDOPA AND LEVODOPA 1 TABLET: 25; 100 TABLET ORAL at 14:34

## 2018-01-20 RX ADMIN — INSULIN HUMAN 10 UNITS: 100 INJECTION, SOLUTION PARENTERAL at 10:40

## 2018-01-20 RX ADMIN — LORAZEPAM 0.5 MG: 2 INJECTION INTRAMUSCULAR; INTRAVENOUS at 13:31

## 2018-01-20 RX ADMIN — DEXTROSE MONOHYDRATE 25 G: 500 INJECTION PARENTERAL at 17:00

## 2018-01-20 RX ADMIN — CALCIUM CARBONATE-VITAMIN D TAB 500 MG-200 UNIT 1 TABLET: 500-200 TAB at 20:59

## 2018-01-20 RX ADMIN — HEPARIN SODIUM 5000 UNITS: 5000 INJECTION, SOLUTION INTRAVENOUS; SUBCUTANEOUS at 14:39

## 2018-01-20 RX ADMIN — SODIUM BICARBONATE: 84 INJECTION, SOLUTION INTRAVENOUS at 13:59

## 2018-01-20 RX ADMIN — SODIUM POLYSTYRENE SULFONATE 30 G: 15 SUSPENSION ORAL; RECTAL at 10:41

## 2018-01-20 RX ADMIN — ALPRAZOLAM 0.25 MG: 0.25 TABLET ORAL at 14:34

## 2018-01-20 RX ADMIN — ACETAMINOPHEN 650 MG: 325 TABLET ORAL at 13:49

## 2018-01-20 RX ADMIN — CEFTRIAXONE SODIUM 1 G: 10 INJECTION, POWDER, FOR SOLUTION INTRAVENOUS at 16:53

## 2018-01-20 RX ADMIN — ONDANSETRON 4 MG: 2 INJECTION INTRAMUSCULAR; INTRAVENOUS at 14:05

## 2018-01-20 RX ADMIN — HYDROCODONE BITARTRATE AND ACETAMINOPHEN 1 TABLET: 5; 325 TABLET ORAL at 20:59

## 2018-01-20 RX ADMIN — DEXTROSE MONOHYDRATE 25 G: 500 INJECTION PARENTERAL at 10:40

## 2018-01-20 ASSESSMENT — PAIN SCALES - GENERAL
PAINLEVEL_OUTOF10: 0
PAINLEVEL_OUTOF10: 5
PAINLEVEL_OUTOF10: 0

## 2018-01-20 NOTE — CONSULTS
left hand   Heent:  Dry oral mucosa,reactive pupils   Neck: no bruits or jvd noted  Cardiovascular:  S1, S2 without murmur or rubs   Respiratory: CTA B without wheezes or rales   Abdomen:  Soft,good bowel sound and no palpable mass  Ext: No lower extremity edema  Psychiatric: Deferred  Musculoskeletal:   Intact  CNS:Deferred    Data:   Labs:  Lab Results   Component Value Date     01/20/2018     (H) 01/20/2018     05/31/2017    K 6.3 (HH) 01/20/2018    K 6.8 (HH) 01/20/2018    K 4.8 05/31/2017     01/20/2018    CO2 18 (L) 01/20/2018    CO2 18 (L) 01/20/2018    CO2 19 (L) 05/31/2017    CREATININE 10.3 (HH) 01/20/2018    CREATININE 10.2 (HH) 01/20/2018    CREATININE 0.9 05/31/2017    BUN 83 (H) 01/20/2018    BUN 81 (H) 01/20/2018    BUN 22 05/31/2017    GLUCOSE 73 01/20/2018    GLUCOSE 119 (H) 01/20/2018    GLUCOSE 118 (H) 05/31/2017    PHOS 3.5 12/24/2015    WBC 12.8 (H) 01/20/2018    WBC 8.8 05/31/2017    WBC 8.1 04/06/2017    HGB 13.3 (L) 01/20/2018    HGB 16.8 05/31/2017    HGB 17.1 04/06/2017    HCT 39.6 (L) 01/20/2018    HCT 50.5 05/31/2017    HCT 51.0 04/06/2017    MCV 96.1 (H) 01/20/2018     01/20/2018     Labs reviewed     Imaging:  CXR results:Diagnostic images reports reviewed         Thank you Dr. Maddy Rios DO for allowing us to participate in care of Megan Munguia       Electronically signed by Ayaka Felipe MD on 1/20/2018 at 4:37 PM

## 2018-01-20 NOTE — ED TRIAGE NOTES
Presents to ED with c/o generalized weakness that has been getting worse. Patient has Parkinsons. Denies pain. Denies chest pain. Denies SOB. Respirations easy and unlabored.

## 2018-01-20 NOTE — ED NOTES
Bed: 022A  Expected date: 1/20/18  Expected time:   Means of arrival:   Comments:  ATFD/WEAKNESS     Lucille Headley MA  01/20/18 0900

## 2018-01-20 NOTE — ED NOTES
Patient resting in bed. Respirations easy and unlabored. No distress noted. Call light within reach.         William James RN  01/20/18 [id number]

## 2018-01-20 NOTE — H&P
History & Physical      PCP: Uche Bacon DO    Date of Admission: 1/20/2018    Date of Service: 1/20/18    Chief Complaint:  Fatigue, shaking, hallucinations    History Of Present Illness:    History obtained from chart review and unobtainable from patient due to mental status. 80 y.o. male who presented to [facility] with complaint of fatigue poor PO intake, hallucinations. History comes from family at the bedside who state he hasn't been feeling well - nauseated - recently, has been reaching for and talking to objects that are not physically present. He has a history of PD, follows with Dr. Allyson Ely and is on Amantadine and Sinemet. Past Medical History:          Diagnosis Date    KRISTIAN (acute kidney injury) ([address] 1/20/2018    Arthritis     back    Blood transfusion reaction 1946    BPH (benign prostatic hypertrophy) 2/27/2012    Cancer ([address]     skin    DDD (degenerative disc disease), lumbar 12/25/2015    Erectile dysfunction     Neuropathy ([address]     Parkinsonism ([address] 12/25/2015       Past Surgical History:          Procedure Laterality Date    APPENDECTOMY  1946    CARPAL TUNNEL RELEASE Bilateral     CATARACT REMOVAL      COLONOSCOPY  01-    EYE SURGERY  1992    bilateral catract    HERNIA REPAIR Right 11/3/2014    Dr. Bud Canales  2010    right, knee    MOHS SURGERY Left     EAR WITH GRAFT    MOHS SURGERY  04/02/2017    repair of BCC of nasal bridge    OTHER SURGICAL HISTORY Left 11/23/2016    Excision squamous cell carcinoma left cheek    SKIN BIOPSY         Medications Prior to Admission:      Prior to Admission medications    Medication Sig Start Date End Date Taking?  Authorizing Provider   carbidopa-levodopa (SINEMET)  MG per tablet Take 1 tablet by mouth 3 times daily 1/9/18   Uche Bacon DO   promethazine (PHENERGAN) 12.5 MG tablet Take 1 tablet by mouth daily as needed for Nausea 1/9/18   Uche Bacon

## 2018-01-20 NOTE — ED NOTES
Called 4K, 4K RN states house keeping still needs to \"blaze\" the room before transferring pt.      Roosevelt Hathaway, EMT-P  01/20/18 [id number]

## 2018-01-20 NOTE — CONSULTS
Nephrology Consult Note  Patient's Name: Lencho Del Castillo  2:04 PM  1/20/2018    Nephrologist: Tyrese Carrillo    Reason for Consult:  Acute kidney injury. Hyponatremia. Metabolic acidosis. Requesting Physician:  Rico Weeks DO    Chief Complaint:  None  Assessment  1-I acute kidney injury likely due to combination of renal hypoperfusion from hypotension compounded by recent multiple intravenous contrast studies. 2-hyponatremia from acute kidney injury  3-metabolic acidosis from acute kidney injury and tissue hypoxia  4. Elevated troponin level  5. Leukocytosis  6. Anemia  7. Elevated PSA level  8. Cervical spine stenosis  9. Moderate to severe aortic valve stenosis  10.  6 cm infrarenal abdominal aortic aneurysm  11. Lumbar spine stenosis  12. Large BPH mistaken for bladder mass status post  TURP  13.  65% left internal carotid artery stenosis    Plan  1-labs reviewed  2-medications reviewed  3-CT of the head report reviewed  4. CTA of the neck report reviewed  5. CT of the lumbar spine report reviewed  6. CT of the abdomen and pelvis report reviewed  7. Recent echocardiogram report reviewed  8. Discontinue ibuprofen  9. Increase intravenous fluid to 100 ml per hour from 75 for ml per hour  10. Labs in the morning  11. Discussed with the staff      History Obtained From:  Staff and medical record  History of Present Ilness: Lencho Del Castillo is a 80 y.o. male with history of degenerative joint disease, BPH, Parkinson's disease, skin cancer, was admitted through the emergency department after the patient presented there with weakness, hallucinations , shaking and urinary retention. CAT scan of abdomen and pelvis where done. There was a questionable bladder mass. Dr. Laura Gallegos from urology was consulted. Cystoscopy revealed the bladder mass to actually be prostate gland that was protruding into the urinary bladder. He had transurethral resection of the prostate gland done.   His baseline serum creatinine is now between 1.1 up to 1.7 mg/dL. He has never seen a nephrologist in the past according to him. Today it is 2.5 mg per deciliter. As a result I was asked to see him. His blood pressure has been low. He had  multiple intravenous contrast studies done. Past Medical History:   Diagnosis Date    KRISTIAN (acute kidney injury) ([address] 1/20/2018    Arthritis     back    Blood transfusion reaction 1946    BPH (benign prostatic hypertrophy) 2/27/2012    Cancer (HCC)     skin    DDD (degenerative disc disease), lumbar 12/25/2015    Erectile dysfunction     [facility]     Parkinsonism ([facility] 12/25/2015       Past Surgical History:   Procedure Laterality Date    APPENDECTOMY  1946    CARPAL TUNNEL RELEASE Bilateral     CATARACT REMOVAL      COLONOSCOPY  01-    EYE SURGERY  1992    bilateral catract    HERNIA REPAIR Right 11/3/2014    Dr. Jaida Rocha  2010    right, knee    MOHS SURGERY Left     EAR WITH GRAFT    MOHS SURGERY  04/02/2017    repair of BCC of nasal bridge    OTHER SURGICAL HISTORY Left 11/23/2016    Excision squamous cell carcinoma left cheek    SKIN BIOPSY         Family History   Problem Relation Age of Onset    Cancer Brother 68     pancreatic        reports that he has never smoked. He has never used smokeless tobacco. He reports that he does not drink alcohol or use drugs. Allergies:  Review of patient's allergies indicates no known allergies.     Current Medications:      [START ON 1/21/2018] aspirin EC tablet 81 mg Daily   traZODone (DESYREL) tablet 100 mg Nightly PRN   promethazine (PHENERGAN) tablet 12.5 mg Daily PRN   polyethylene glycol (GLYCOLAX) powder 17 g Daily PRN   animal shapes plus iron (ANIMAL SHAPES) 18 MG chewable tablet 1 tablet Daily   HYDROcodone-acetaminophen (NORCO) 5-325 MG per tablet 1 tablet Q6H PRN   DULoxetine (CYMBALTA) extended release capsule 30 mg Daily   carbidopa-levodopa (SINEMET)  MG per tablet 1 tablet TID   calcium-vitamin D (OSCAL-500) 500-200 MG-UNIT per tablet 1 tablet BID   busPIRone (BUSPAR) tablet 10 mg BID   ALPRAZolam (XANAX) tablet 0.25 mg TID PRN   sodium bicarbonate 75 mEq in sodium chloride 0.45 % 1,000 mL infusion Continuous   0.9 % sodium chloride infusion Continuous   sodium chloride flush 0.9 % injection 10 mL 2 times per day   sodium chloride flush 0.9 % injection 10 mL PRN   potassium chloride (KLOR-CON M) extended release tablet 40 mEq PRN   Or    potassium chloride 20 MEQ/15ML (10%) oral solution 40 mEq PRN   Or    potassium chloride 10 mEq/100 mL IVPB (Peripheral Line) PRN   acetaminophen (TYLENOL) tablet 650 mg Q4H PRN   magnesium hydroxide (MILK OF MAGNESIA) 400 MG/5ML suspension 30 mL Daily PRN   ondansetron (ZOFRAN) injection 4 mg Q6H PRN   heparin (porcine) injection 5,000 Units BID       Review of Systems:   Pertinent positives stated above in HPI. All other systems were reviewed and were negative. ROS:Constitutional: negative  Eyes: negative  Ears, nose, mouth, throat, and face: positive for hearing loss  Respiratory: negative  Cardiovascular: negative  Gastrointestinal: negative  Genitourinary:positive for hesitancy and urinary incontinence  Integument/breast: negative  Hematologic/lymphatic: negative  Musculoskeletal:positive for arthralgias, back pain, neck pain and stiff joints  Neurological: positive for coordination problems, gait problems, memory problems, speech problems, tremors and weakness  Behavioral/Psych: negative  Endocrine: negative  Allergic/Immunologic: negative    Physical exam:   Constitutional:  Chronically ill-looking elderly gentleman in no distress. Vitals:   Vitals:    01/20/18 1315   BP:    Pulse:    Resp:    Temp: 101 °F (38.3 °C)   SpO2:        Skin: no rash, turgor wnl  Heent:  Exam reactive to accommodation. Throat is clear. Oral mucosa is moist.  He has hearing difficulties.   Neck: no bruits or jvd noted  Cardiovascular:  Sinus rhythm without rubs or

## 2018-01-20 NOTE — ED PROVIDER NOTES
Cancer ([facility] [facility] DDD (degenerative disc disease), lumbar; Erectile dysfunction; Neuropathy ([facility] [address] [facility] and Parkinsonism ([facility] [address] SURGICAL HISTORY      has a past surgical history that includes Cataract removal; Carpal tunnel release (Bilateral); Appendectomy (1946); joint replacement (); skin biopsy; eye surgery (); Colonoscopy (2014); Mohs surgery (Left); hernia repair (Right, 11/3/2014); other surgical history (Left, 2016); and Mohs surgery (2017). CURRENT MEDICATIONS       Previous Medications    ALPRAZOLAM (XANAX) 0.25 MG TABLET    Take 1 tablet by mouth 3 times daily as needed for Anxiety    AMANTADINE (SYMMETREL) 100 MG CAPSULE    Take 1 capsule by mouth 2 times daily    BUSPIRONE (BUSPAR) 10 MG TABLET    Take 1 tablet by mouth 2 times daily    CALCIUM-VITAMIN D (OSCAL) 250-125 MG-UNIT PER TABLET    Take 1 tablet by mouth daily    CARBIDOPA-LEVODOPA (SINEMET)  MG PER TABLET    Take 1 tablet by mouth 3 times daily    COMPRESSION STOCKINGS MISC    Knee-high. 20-30 mmHg. Dx: LE edema    DULOXETINE (CYMBALTA) 30 MG CAPSULE    Take 1 capsule by mouth daily    HYDROCODONE-ACETAMINOPHEN (NORCO) 5-325 MG PER TABLET    Take 1 tablet by mouth every 6 hours as needed for Pain . MULTIPLE VITAMIN CHEW    Take 1 tablet by mouth daily     POLYETHYLENE GLYCOL (MIRALAX) POWDER    Take 17 g by mouth daily as needed     PROMETHAZINE (PHENERGAN) 12.5 MG TABLET    Take 1 tablet by mouth daily as needed for Nausea    TRAZODONE (DESYREL) 100 MG TABLET    take 1 tablet by mouth at night as needed for sleep. ALLERGIES     has No Known Allergies. FAMILY HISTORY     indicated that his mother is . He indicated that his father is . He indicated that his brother is . family history includes Cancer (age of onset: 68) in his brother. SOCIAL HISTORY      reports that he has never smoked.  He has never used smokeless tobacco. He reports that he does not drink alcohol or use

## 2018-01-20 NOTE — PROGRESS NOTES
Patient arrived to [id number] from ED with complaints of generalized weakness. Vital signs stable. IV to left forearm saline locked upon arrival. Alert to person only. Family at bedside states that patient has had ongoing confusion and weakness at home. Generalized tremors.

## 2018-01-21 LAB
ANION GAP SERPL CALCULATED.3IONS-SCNC: 23 MEQ/L (ref 8–16)
BUN BLDV-MCNC: 89 MG/DL (ref 7–22)
CALCIUM SERPL-MCNC: 7.9 MG/DL (ref 8.5–10.5)
CHLORIDE BLD-SCNC: 106 MEQ/L (ref 98–111)
CO2: 19 MEQ/L (ref 23–33)
CREAT SERPL-MCNC: 11.1 MG/DL (ref 0.4–1.2)
GFR SERPL CREATININE-BSD FRML MDRD: 4 ML/MIN/1.73M2
GLUCOSE BLD-MCNC: 84 MG/DL (ref 70–108)
HCT VFR BLD CALC: 35.7 % (ref 42–52)
HEMOGLOBIN: 12.1 GM/DL (ref 14–18)
MAGNESIUM: 3 MG/DL (ref 1.6–2.4)
MCH RBC QN AUTO: 32.6 PG (ref 27–31)
MCHC RBC AUTO-ENTMCNC: 33.9 GM/DL (ref 33–37)
MCV RBC AUTO: 96 FL (ref 80–94)
PDW BLD-RTO: 13 % (ref 11.5–14.5)
PHOSPHORUS: 6.2 MG/DL (ref 2.4–4.7)
PLATELET # BLD: 148 THOU/MM3 (ref 130–400)
PMV BLD AUTO: 8.4 MCM (ref 7.4–10.4)
POTASSIUM REFLEX MAGNESIUM: 5.9 MEQ/L (ref 3.5–5.2)
POTASSIUM SERPL-SCNC: 5.5 MEQ/L (ref 3.5–5.2)
POTASSIUM SERPL-SCNC: 5.9 MEQ/L (ref 3.5–5.2)
RBC # BLD: 3.72 MILL/MM3 (ref 4.7–6.1)
SODIUM BLD-SCNC: 148 MEQ/L (ref 135–145)
WBC # BLD: 12.1 THOU/MM3 (ref 4.8–10.8)

## 2018-01-21 PROCEDURE — 99232 SBSQ HOSP IP/OBS MODERATE 35: CPT | Performed by: INTERNAL MEDICINE

## 2018-01-21 PROCEDURE — 2500000003 HC RX 250 WO HCPCS: Performed by: INTERNAL MEDICINE

## 2018-01-21 PROCEDURE — 99221 1ST HOSP IP/OBS SF/LOW 40: CPT | Performed by: NURSE PRACTITIONER

## 2018-01-21 PROCEDURE — 2580000003 HC RX 258: Performed by: INTERNAL MEDICINE

## 2018-01-21 PROCEDURE — 6360000002 HC RX W HCPCS: Performed by: INTERNAL MEDICINE

## 2018-01-21 PROCEDURE — 80048 BASIC METABOLIC PNL TOTAL CA: CPT

## 2018-01-21 PROCEDURE — 6370000000 HC RX 637 (ALT 250 FOR IP): Performed by: INTERNAL MEDICINE

## 2018-01-21 PROCEDURE — 36415 COLL VENOUS BLD VENIPUNCTURE: CPT

## 2018-01-21 PROCEDURE — 84100 ASSAY OF PHOSPHORUS: CPT

## 2018-01-21 PROCEDURE — 84132 ASSAY OF SERUM POTASSIUM: CPT

## 2018-01-21 PROCEDURE — 83735 ASSAY OF MAGNESIUM: CPT

## 2018-01-21 PROCEDURE — 2060000000 HC ICU INTERMEDIATE R&B

## 2018-01-21 PROCEDURE — 85027 COMPLETE CBC AUTOMATED: CPT

## 2018-01-21 RX ORDER — SODIUM POLYSTYRENE SULFONATE 15 G/60ML
15 SUSPENSION ORAL; RECTAL ONCE
Status: COMPLETED | OUTPATIENT
Start: 2018-01-21 | End: 2018-01-21

## 2018-01-21 RX ORDER — DEXTROSE MONOHYDRATE 25 G/50ML
25 INJECTION, SOLUTION INTRAVENOUS ONCE
Status: COMPLETED | OUTPATIENT
Start: 2018-01-21 | End: 2018-01-21

## 2018-01-21 RX ORDER — SEVELAMER CARBONATE 800 MG/1
800 TABLET, FILM COATED ORAL
Status: DISCONTINUED | OUTPATIENT
Start: 2018-01-21 | End: 2018-01-24 | Stop reason: HOSPADM

## 2018-01-21 RX ORDER — FUROSEMIDE 10 MG/ML
80 INJECTION INTRAMUSCULAR; INTRAVENOUS ONCE
Status: COMPLETED | OUTPATIENT
Start: 2018-01-21 | End: 2018-01-21

## 2018-01-21 RX ADMIN — Medication: at 01:23

## 2018-01-21 RX ADMIN — ALPRAZOLAM 0.25 MG: 0.25 TABLET ORAL at 17:18

## 2018-01-21 RX ADMIN — CARBIDOPA AND LEVODOPA 2 TABLET: 25; 100 TABLET ORAL at 13:33

## 2018-01-21 RX ADMIN — Medication 15 G: at 11:17

## 2018-01-21 RX ADMIN — CARBIDOPA AND LEVODOPA 2 TABLET: 25; 100 TABLET ORAL at 09:19

## 2018-01-21 RX ADMIN — ACETAMINOPHEN 650 MG: 325 TABLET ORAL at 01:28

## 2018-01-21 RX ADMIN — Medication: at 16:09

## 2018-01-21 RX ADMIN — INSULIN HUMAN 8 UNITS: 100 INJECTION, SOLUTION PARENTERAL at 11:17

## 2018-01-21 RX ADMIN — FUROSEMIDE 80 MG: 10 INJECTION, SOLUTION INTRAMUSCULAR; INTRAVENOUS at 11:35

## 2018-01-21 RX ADMIN — ALPRAZOLAM 0.25 MG: 0.25 TABLET ORAL at 09:19

## 2018-01-21 RX ADMIN — ONDANSETRON 4 MG: 2 INJECTION INTRAMUSCULAR; INTRAVENOUS at 10:37

## 2018-01-21 RX ADMIN — BUSPIRONE HYDROCHLORIDE 10 MG: 10 TABLET ORAL at 09:19

## 2018-01-21 RX ADMIN — DEXTROSE MONOHYDRATE 25 G: 25 INJECTION, SOLUTION INTRAVENOUS at 11:17

## 2018-01-21 RX ADMIN — SEVELAMER CARBONATE 800 MG: 800 TABLET, FILM COATED ORAL at 13:33

## 2018-01-21 RX ADMIN — CALCIUM CARBONATE-VITAMIN D TAB 500 MG-200 UNIT 1 TABLET: 500-200 TAB at 09:19

## 2018-01-21 RX ADMIN — BUSPIRONE HYDROCHLORIDE 10 MG: 10 TABLET ORAL at 20:43

## 2018-01-21 RX ADMIN — ASPIRIN 81 MG: 81 TABLET, COATED ORAL at 09:19

## 2018-01-21 RX ADMIN — HYDROCODONE BITARTRATE AND ACETAMINOPHEN 1 TABLET: 5; 325 TABLET ORAL at 20:48

## 2018-01-21 RX ADMIN — CARBIDOPA AND LEVODOPA 2 TABLET: 25; 100 TABLET ORAL at 20:43

## 2018-01-21 RX ADMIN — HYDROCODONE BITARTRATE AND ACETAMINOPHEN 1 TABLET: 5; 325 TABLET ORAL at 14:38

## 2018-01-21 RX ADMIN — FUROSEMIDE 80 MG: 10 INJECTION, SOLUTION INTRAMUSCULAR; INTRAVENOUS at 18:51

## 2018-01-21 RX ADMIN — Medication 10 ML: at 10:37

## 2018-01-21 RX ADMIN — PEDIATRIC MULTIPLE VITAMINS W/ IRON CHEW TAB 18 MG 1 TABLET: 18 CHEW TAB at 09:27

## 2018-01-21 RX ADMIN — SEVELAMER CARBONATE 800 MG: 800 TABLET, FILM COATED ORAL at 17:19

## 2018-01-21 RX ADMIN — ACETAMINOPHEN 650 MG: 325 TABLET ORAL at 18:48

## 2018-01-21 RX ADMIN — DULOXETINE HYDROCHLORIDE 30 MG: 30 CAPSULE, DELAYED RELEASE ORAL at 09:19

## 2018-01-21 RX ADMIN — ONDANSETRON 4 MG: 2 INJECTION INTRAMUSCULAR; INTRAVENOUS at 17:14

## 2018-01-21 RX ADMIN — CALCIUM CARBONATE-VITAMIN D TAB 500 MG-200 UNIT 1 TABLET: 500-200 TAB at 20:43

## 2018-01-21 RX ADMIN — ALPRAZOLAM 0.25 MG: 0.25 TABLET ORAL at 01:28

## 2018-01-21 ASSESSMENT — PAIN SCALES - GENERAL
PAINLEVEL_OUTOF10: 5
PAINLEVEL_OUTOF10: 0
PAINLEVEL_OUTOF10: 4
PAINLEVEL_OUTOF10: 3
PAINLEVEL_OUTOF10: 3
PAINLEVEL_OUTOF10: 2
PAINLEVEL_OUTOF10: 1

## 2018-01-21 NOTE — PLAN OF CARE
Problem: Falls - Risk of  Goal: Absence of falls  Outcome: Ongoing  Patient high risk for falls due to confusion and generalized weakness. Patient strict bedrest. Bed alarm in place. Family remains at bedside. Problem: Risk for Impaired Skin Integrity  Goal: Tissue integrity - skin and mucous membranes  Structural intactness and normal physiological function of skin and  mucous membranes. Outcome: Ongoing  High risk for impaired skin integrity due to immobility and generalized weakness. Scattered bruising- bilateral upper extremities, worst on hands, bruising on right upper lateral back. Scattered abrasions on bilateral lower extremities. Large abrasion to right lateral knee. Problem: Infection - Urinary Catheter-Associated Urinary Tract Infection:  Goal: Absence of urinary tract infection signs and symptoms  Absence of urinary tract infection signs and symptoms     Outcome: Ongoing  Armendariz catheter in place for strict intake and output monitoring. Armendariz care completed this shift. Urine remains yellow-hazy. Educated on risk of UTI. Problem: DISCHARGE BARRIERS  Goal: Discharged to appropriate level of care  Discharged to appropriate level of care     Outcome: Ongoing  Discharge planning still in process. Problem: Fluid Volume:  Goal: Maintenance of adequate hydration will improve  Maintenance of adequate hydration will improve   Outcome: Ongoing  Patient tolerating PO intake. Only taking sips at times. IV hydration continues. Monitoring strict intake and output. Problem: FLUID AND ELECTROLYTE IMBALANCE  Goal: Electrolyte and acid/base balance  Balance of electrolytes and nonelectrolytes in the intracellular and  extracellular compartments of the body. Outcome: Ongoing  Monitoring labs as ordered. Continues to have elevated potassium levels- lactulose, lasix, and dextrose/insulin given this shift. IV fluids continued. Comments: Care plan reviewed with patient and family.   Patient and family verbalize understanding of the plan of care and contribute to goal setting.

## 2018-01-21 NOTE — PROGRESS NOTES
Subcutaneous BID    carbidopa-levodopa  2 tablet Oral TID     Continuous Infusions:   IV infusion builder 100 mL/hr at 01/21/18 0123    dextrose 5 % and 0.9 % NaCl         CBC:   Recent Labs      01/20/18   0927  01/21/18   0450   WBC  12.8*  12.1*   HGB  13.3*  12.1*   PLT  168  148     CMP:  Recent Labs      01/20/18   0927  01/20/18   1420  01/20/18   1815  01/21/18   0450   NA  146*  145   --   148*   K  6.8*  6.3*  6.4*  5.9*   CL  106  104   --   106   CO2  18*  18*   --   19*   BUN  81*  83*   --   89*   CREATININE  10.2*  10.3*   --   11.1*   GLUCOSE  119*  73   --   84   CALCIUM  8.3*  8.4*   --   7.9*   LABGLOM  5*  5*   --   4*     Troponin: No results for input(s): TROPONINI in the last 72 hours. BNP: No results for input(s): BNP in the last 72 hours. INR: No results for input(s): INR in the last 72 hours. Lipids: No results for input(s): CHOL, LDLDIRECT, TRIG, HDL, AMYLASE, LIPASE in the last 72 hours. Liver: No results for input(s): AST, ALT, ALKPHOS, PROT, LABALBU, BILITOT in the last 72 hours. Invalid input(s): BILDIR  Iron:  No results for input(s): IRONS, FERRITIN in the last 72 hours. Invalid input(s): LABIRONS    Objective:   Vitals: BP (!) 147/71   Pulse 80   Temp 99 °F (37.2 °C) (Axillary)   Resp 20   Ht 5' 7\" (1.702 m)   Wt 151 lb 3.2 oz (68.6 kg)   SpO2 95%   BMI 23.68 kg/m²    Wt Readings from Last 3 Encounters:   01/21/18 151 lb 3.2 oz (68.6 kg)   01/09/18 145 lb (65.8 kg)   09/25/17 148 lb (67.1 kg)      24HR INTAKE/OUTPUT:    Intake/Output Summary (Last 24 hours) at 01/21/18 0902  Last data filed at 01/21/18 0726   Gross per 24 hour   Intake          2017.59 ml   Output              180 ml   Net          1837.59 ml       Constitutional:  Alert, awake, no apparent distress   Skin:normal   HEENT:Pupils are reactive . Throat is clear   Neck:supple with no jvd or bruit  Cardiovascular:  S1, S2 without m/r/g   Respiratory:  CTA B without w/r/r   Abdomen: +bs, soft, non

## 2018-01-21 NOTE — CONSULTS
SURGICAL HISTORY Left 11/23/2016    Excision squamous cell carcinoma left cheek    SKIN BIOPSY       Allergies:  Review of patient's allergies indicates no known allergies. Social History     Social History    Marital status:      Spouse name: N/A    Number of children: N/A    Years of education: N/A     Occupational History    Not on file. Social History Main Topics    Smoking status: Never Smoker    Smokeless tobacco: Never Used    Alcohol use No    Drug use: No    Sexual activity: Yes     Partners: Female     Other Topics Concern    Not on file     Social History Narrative    No narrative on file       Family History:       Problem Relation Age of Onset    Cancer Brother 68     pancreatic       ROS:  Constitutional: Negative for chills, fatigue, fever, or weight loss. Eyes: Denies reported visual changes. ENT: Denies headache, difficulty swallowing, earache, and nosebleeds. Cardiovascular: Negative for chest pain, palpitations, tachycardia or edema. Respiratory: Denies cough or SOB. GI:The patient denies abdominal or flank pain, anorexia, nausea or vomiting. : see HPI. Musculoskeletal: Patient denies low back pain or painful or reduced range of ROM. Neurological: The patient denies any symptoms of neurological impairment or TIA. Psychiatric: Denies anxiety or depression. Skin: Denies rash or lesions. All remaining ROS negative. PHYSICAL EXAM:  VITALS:  BP (!) 147/71   Pulse 80   Temp 99 °F (37.2 °C) (Axillary)   Resp 20   Ht 5' 7\" (1.702 m)   Wt 151 lb 3.2 oz (68.6 kg)   SpO2 95%   BMI 23.68 kg/m² . Nursing note and vitals reviewed. Constitutional: Alert, confused. HEENT:   Head:         Normocephalic and atraumatic. Mouth/Throat:          Mucous membranes are normal.   Eyes:         EOM are normal. No scleral icterus. Nose:    The external appearance of the nose is normal  Ears: The ears appear normal to external inspection.    Cardiovascular: Normal rate, regular rhythm. Pulmonary/Chest:  Normal respiratory rate and rhthym. No use of accessory muscles. Lungs clear bilaterally. Abdominal:          Soft. No tenderness. Active bowel sounds             Genitalia:    Armendariz draining clear/yellow urine. Musculoskeletal:    Normal range of motion. He exhibits no edema or tenderness of lower extremities. Extremities:    No cyanosis, clubbing, or edema present. Neurological:    Alert, confused. DATA:  CBC:   Lab Results   Component Value Date    WBC 12.1 01/21/2018    RBC 3.72 01/21/2018    HGB 12.1 01/21/2018    HCT 35.7 01/21/2018    MCV 96.0 01/21/2018    MCH 32.6 01/21/2018    MCHC 33.9 01/21/2018    RDW 13.0 01/21/2018     01/21/2018    MPV 8.4 01/21/2018     BMP:    Lab Results   Component Value Date     01/21/2018    K 5.9 01/21/2018     01/21/2018    CO2 19 01/21/2018    BUN 89 01/21/2018    CREATININE 11.1 01/21/2018    CALCIUM 7.9 01/21/2018    LABGLOM 4 01/21/2018    GLUCOSE 84 01/21/2018     BUN/Creatinine:    Lab Results   Component Value Date    BUN 89 01/21/2018    CREATININE 11.1 01/21/2018     Magnesium:    Lab Results   Component Value Date    MG 3.0 01/21/2018     Phosphorus:    Lab Results   Component Value Date    PHOS 6.2 01/21/2018     PT/INR:  No results found for: PROTIME, INR  U/A:    Lab Results   Component Value Date    NITRITE neg 02/27/2012    COLORU YELLOW 01/20/2018    PHUR 6.0 01/20/2018    LABCAST NONE SEEN 12/24/2015    LABCAST NONE SEEN 12/24/2015    WBCUA 5-10 01/20/2018    RBCUA 50-75 01/20/2018    MUCUS NONE SEEN 01/20/2018    YEAST NONE SEEN 05/31/2017    BACTERIA NONE 01/20/2018    SPECGRAV 1.015 12/24/2015    LEUKOCYTESUR TRACE 01/20/2018    UROBILINOGEN 0.2 01/20/2018    BILIRUBINUR NEGATIVE 01/20/2018    BLOODU LARGE 01/20/2018    GLUCOSEU NEGATIVE 01/20/2018    AMORPHOUS DEBRIS 01/20/2018       Imaging:    The patient has had a Renal Ultrasound which I have reviewed along with its accompanying

## 2018-01-21 NOTE — PROGRESS NOTES
CHEST PORTABLE CLINICAL INFORMATION: FATIGUE, COMPARISON: 5/31/2017 TECHNIQUE: A single mobile view of the chest was obtained. 1. Normal heart size. No effusion is seen. 2. Mild/early atelectasis/pneumonia left lung base. **This report has been created using voice recognition software. It may contain minor errors which are inherent in voice recognition technology. ** Final report electronically signed by Dr. Bart Hatfield on 1/20/2018 10:33 AM      Diet: Diet NPO, After Midnight  DIET GENERAL;     Disposition:    [x] Home       [] TCU       [] Rehab       [] Psych       [x] SNF       [] [place]       [] Other-    Code Status: Limited    Assessment/Plan:    Active Hospital Problems    Diagnosis Date Noted    Elevated PSA [R97.20]     Ureteral obstruction [N13.5]     Bilateral hydronephrosis [N13.30]     KRISTIAN (acute kidney injury) ([facility] [address] [N17.9] 01/20/2018    Dehydration [E86.0] 01/20/2018    Hyperkalemia [E87.5] 01/20/2018    Hyponatremia [A71.9]     Metabolic acidosis [I50.9]     Parkinson's disease ([facility] [address] Migue Moore 12/26/2015    Adjustment Disorder with Mixed Anxiety and Depressed Mood 09/04/2012    Benign prostatic hyperplasia [N40.0] 02/27/2012     KRISTIAN 2/2 post-obstructive etio. Renal US with bilateral hydronephrosis  Elevated PSA. Possibly prostate ca. Hx of BPH  Acute metabolic encephalopathy with underlying Parkinson's dz.   Likely 2/2 uremia  Hyperkalemia 2/2 KRISTIAN    Planned for OR per urology for bilateral stent placement  Kayexalate x3 per nephro  Insulin / glucose  IVF hydration    Electronically signed by Alena Cadet MD on 1/21/2018 at 3:31 PM

## 2018-01-22 ENCOUNTER — APPOINTMENT (OUTPATIENT)
Dept: INTERVENTIONAL RADIOLOGY/VASCULAR | Age: 83
DRG: 682 | End: 2018-01-22
Payer: MEDICARE

## 2018-01-22 ENCOUNTER — TELEPHONE (OUTPATIENT)
Dept: UROLOGY | Age: 83
End: 2018-01-22

## 2018-01-22 ENCOUNTER — APPOINTMENT (OUTPATIENT)
Dept: CT IMAGING | Age: 83
DRG: 682 | End: 2018-01-22
Payer: MEDICARE

## 2018-01-22 PROBLEM — E44.0 MODERATE MALNUTRITION (HCC): Status: ACTIVE | Noted: 2018-01-22

## 2018-01-22 LAB
ANION GAP SERPL CALCULATED.3IONS-SCNC: 25 MEQ/L (ref 8–16)
APTT: 26.8 SECONDS (ref 22–38)
BUN BLDV-MCNC: 100 MG/DL (ref 7–22)
CALCIUM SERPL-MCNC: 7.6 MG/DL (ref 8.5–10.5)
CHLORIDE BLD-SCNC: 101 MEQ/L (ref 98–111)
CO2: 21 MEQ/L (ref 23–33)
CREAT SERPL-MCNC: 11.7 MG/DL (ref 0.4–1.2)
GFR SERPL CREATININE-BSD FRML MDRD: 4 ML/MIN/1.73M2
GLUCOSE BLD-MCNC: 110 MG/DL (ref 70–108)
HCT VFR BLD CALC: 37.5 % (ref 42–52)
HEMOGLOBIN: 12.3 GM/DL (ref 14–18)
INR BLD: 1.05 (ref 0.85–1.13)
MCH RBC QN AUTO: 31.9 PG (ref 27–31)
MCHC RBC AUTO-ENTMCNC: 32.9 GM/DL (ref 33–37)
MCV RBC AUTO: 96.9 FL (ref 80–94)
MRSA SCREEN: NORMAL
ORGANISM: ABNORMAL
PDW BLD-RTO: 12.9 % (ref 11.5–14.5)
PLATELET # BLD: 148 THOU/MM3 (ref 130–400)
PMV BLD AUTO: 8.5 MCM (ref 7.4–10.4)
POTASSIUM SERPL-SCNC: 5.4 MEQ/L (ref 3.5–5.2)
PTH INTACT: 406.9 PG/ML (ref 15–65)
RBC # BLD: 3.87 MILL/MM3 (ref 4.7–6.1)
SODIUM BLD-SCNC: 147 MEQ/L (ref 135–145)
URINE CULTURE REFLEX: ABNORMAL
VITAMIN D 25-HYDROXY: 42 NG/ML (ref 30–100)
VRE CULTURE: NORMAL
WBC # BLD: 14.3 THOU/MM3 (ref 4.8–10.8)

## 2018-01-22 PROCEDURE — 85610 PROTHROMBIN TIME: CPT

## 2018-01-22 PROCEDURE — 2060000000 HC ICU INTERMEDIATE R&B

## 2018-01-22 PROCEDURE — 0T773DZ DILATION OF LEFT URETER WITH INTRALUMINAL DEVICE, PERCUTANEOUS APPROACH: ICD-10-PCS | Performed by: RADIOLOGY

## 2018-01-22 PROCEDURE — 74176 CT ABD & PELVIS W/O CONTRAST: CPT

## 2018-01-22 PROCEDURE — A6258 TRANSPARENT FILM >16<=48 IN: HCPCS

## 2018-01-22 PROCEDURE — C1758 CATHETER, URETERAL: HCPCS

## 2018-01-22 PROCEDURE — 82306 VITAMIN D 25 HYDROXY: CPT

## 2018-01-22 PROCEDURE — C2617 STENT, NON-COR, TEM W/O DEL: HCPCS

## 2018-01-22 PROCEDURE — 99232 SBSQ HOSP IP/OBS MODERATE 35: CPT | Performed by: UROLOGY

## 2018-01-22 PROCEDURE — 85730 THROMBOPLASTIN TIME PARTIAL: CPT

## 2018-01-22 PROCEDURE — 50695 PLMT URETERAL STENT PRQ: CPT

## 2018-01-22 PROCEDURE — C1894 INTRO/SHEATH, NON-LASER: HCPCS

## 2018-01-22 PROCEDURE — 2580000003 HC RX 258: Performed by: RADIOLOGY

## 2018-01-22 PROCEDURE — A4614 HAND-HELD PEFR METER: HCPCS

## 2018-01-22 PROCEDURE — 50430 NJX PX NFROSGRM &/URTRGRM: CPT

## 2018-01-22 PROCEDURE — 83970 ASSAY OF PARATHORMONE: CPT

## 2018-01-22 PROCEDURE — 99232 SBSQ HOSP IP/OBS MODERATE 35: CPT | Performed by: INTERNAL MEDICINE

## 2018-01-22 PROCEDURE — 6370000000 HC RX 637 (ALT 250 FOR IP): Performed by: INTERNAL MEDICINE

## 2018-01-22 PROCEDURE — 6360000004 HC RX CONTRAST MEDICATION: Performed by: RADIOLOGY

## 2018-01-22 PROCEDURE — 6360000002 HC RX W HCPCS: Performed by: RADIOLOGY

## 2018-01-22 PROCEDURE — 36415 COLL VENOUS BLD VENIPUNCTURE: CPT

## 2018-01-22 PROCEDURE — 85027 COMPLETE CBC AUTOMATED: CPT

## 2018-01-22 PROCEDURE — 2500000003 HC RX 250 WO HCPCS

## 2018-01-22 PROCEDURE — C1769 GUIDE WIRE: HCPCS

## 2018-01-22 PROCEDURE — 0T9130Z DRAINAGE OF LEFT KIDNEY WITH DRAINAGE DEVICE, PERCUTANEOUS APPROACH: ICD-10-PCS | Performed by: RADIOLOGY

## 2018-01-22 PROCEDURE — 80048 BASIC METABOLIC PNL TOTAL CA: CPT

## 2018-01-22 RX ORDER — MIDAZOLAM HYDROCHLORIDE 1 MG/ML
0.5 INJECTION INTRAMUSCULAR; INTRAVENOUS ONCE
Status: COMPLETED | OUTPATIENT
Start: 2018-01-22 | End: 2018-01-22

## 2018-01-22 RX ORDER — FENTANYL CITRATE 50 UG/ML
50 INJECTION, SOLUTION INTRAMUSCULAR; INTRAVENOUS ONCE
Status: COMPLETED | OUTPATIENT
Start: 2018-01-22 | End: 2018-01-22

## 2018-01-22 RX ORDER — SODIUM CHLORIDE 450 MG/100ML
INJECTION, SOLUTION INTRAVENOUS CONTINUOUS
Status: DISCONTINUED | OUTPATIENT
Start: 2018-01-22 | End: 2018-01-23

## 2018-01-22 RX ORDER — MIDAZOLAM HYDROCHLORIDE 1 MG/ML
1 INJECTION INTRAMUSCULAR; INTRAVENOUS ONCE
Status: COMPLETED | OUTPATIENT
Start: 2018-01-22 | End: 2018-01-22

## 2018-01-22 RX ORDER — FENTANYL CITRATE 50 UG/ML
25 INJECTION, SOLUTION INTRAMUSCULAR; INTRAVENOUS ONCE
Status: COMPLETED | OUTPATIENT
Start: 2018-01-22 | End: 2018-01-22

## 2018-01-22 RX ORDER — CALCITRIOL 0.25 UG/1
0.25 CAPSULE, LIQUID FILLED ORAL DAILY
Status: DISCONTINUED | OUTPATIENT
Start: 2018-01-22 | End: 2018-01-24 | Stop reason: HOSPADM

## 2018-01-22 RX ADMIN — CARBIDOPA AND LEVODOPA 2 TABLET: 25; 100 TABLET ORAL at 13:10

## 2018-01-22 RX ADMIN — CALCIUM CARBONATE-VITAMIN D TAB 500 MG-200 UNIT 1 TABLET: 500-200 TAB at 20:25

## 2018-01-22 RX ADMIN — IOTHALAMATE MEGLUMINE 60 ML: 600 INJECTION INTRAVASCULAR at 17:10

## 2018-01-22 RX ADMIN — CEFAZOLIN SODIUM 1 G: 1 INJECTION, SOLUTION INTRAVENOUS at 15:14

## 2018-01-22 RX ADMIN — FENTANYL CITRATE 50 MCG: 50 INJECTION, SOLUTION INTRAMUSCULAR; INTRAVENOUS at 16:17

## 2018-01-22 RX ADMIN — HYDROCODONE BITARTRATE AND ACETAMINOPHEN 1 TABLET: 5; 325 TABLET ORAL at 13:09

## 2018-01-22 RX ADMIN — TRAZODONE HYDROCHLORIDE 100 MG: 100 TABLET ORAL at 20:29

## 2018-01-22 RX ADMIN — ALPRAZOLAM 0.25 MG: 0.25 TABLET ORAL at 13:10

## 2018-01-22 RX ADMIN — FENTANYL CITRATE 25 MCG: 50 INJECTION, SOLUTION INTRAMUSCULAR; INTRAVENOUS at 16:45

## 2018-01-22 RX ADMIN — BUSPIRONE HYDROCHLORIDE 10 MG: 10 TABLET ORAL at 20:25

## 2018-01-22 RX ADMIN — SODIUM CHLORIDE: 4.5 INJECTION, SOLUTION INTRAVENOUS at 13:17

## 2018-01-22 RX ADMIN — HYDROCODONE BITARTRATE AND ACETAMINOPHEN 1 TABLET: 5; 325 TABLET ORAL at 04:58

## 2018-01-22 RX ADMIN — CARBIDOPA AND LEVODOPA 2 TABLET: 25; 100 TABLET ORAL at 20:25

## 2018-01-22 RX ADMIN — ALPRAZOLAM 0.25 MG: 0.25 TABLET ORAL at 03:29

## 2018-01-22 RX ADMIN — MIDAZOLAM 1 MG: 1 INJECTION INTRAMUSCULAR; INTRAVENOUS at 16:17

## 2018-01-22 RX ADMIN — MIDAZOLAM 0.5 MG: 1 INJECTION INTRAMUSCULAR; INTRAVENOUS at 16:45

## 2018-01-22 ASSESSMENT — PAIN SCALES - WONG BAKER

## 2018-01-22 ASSESSMENT — PAIN SCALES - GENERAL
PAINLEVEL_OUTOF10: 4
PAINLEVEL_OUTOF10: 0
PAINLEVEL_OUTOF10: 4
PAINLEVEL_OUTOF10: 0

## 2018-01-22 NOTE — PROGRESS NOTES
Renal Progress Note    Assessment and Plan: 1. Acute kidney injury mostly stable though there is small but significant rise in serum creatinine this morning  2. Hyperkalemia improved  3. Parkinson disease  4. Metabolic acidosis improved  5. Adenocarcinoma the prostate gland as suggested by a very high PSA level  6. Bilateral hydronephrosis  7.   Elevated PTH level  PLAN:   labs reviewed and results discussed with the patient's and family members in the room  I discussed with them the the current plan which will be proceeding with stent placement by Dr. Denilson Soto from urology under anesthesia or going through interventional radiology for nephrostomy tube placement  I discussed that with Dr. Denilson Soto from urology  He would discuss with the interventional radiologist Dr. Toya Duong and proceed from there  Discussed with the staff  Add  calcitriol 0.25 µg one capsule a day for high PTH level  Labs in the morning  We'll follow    Patient Active Problem List:     Sebaceous cyst     ED (erectile dysfunction)     Carpal tunnel syndrome     Benign prostatic hyperplasia     Adjustment Disorder with Mixed Anxiety and Depressed Mood     Inguinal hernia, right     DDD (degenerative disc disease), lumbar     Parkinson's disease ([address]     Generalized weakness     Constipation     KRISTIAN (acute kidney injury) ([address]     Dehydration     Hyperkalemia     Hyponatremia     Metabolic acidosis     Elevated PSA     Ureteral obstruction     Bilateral hydronephrosis     Moderate malnutrition ([address]      Subjective:   Admit Date: 1/20/2018    Interval History:   Seeing for acute kidney injury  Very awake this morning when I saw him  Family in the room  I was updated by the staff  No new issues  Urine output is slightly improved  Blood pressure is stable  Admitted for weakness and worsening tremors which is much better now  Apparently did not have much to eat or drink for about 2-3 weeks prior to admission  However diagnostic evaluation revealed a bilateral hydronephrosis      Medications:   Scheduled Meds:   calcitRIOL  0.25 mcg Oral Daily    sevelamer  800 mg Oral TID WC    aspirin  81 mg Oral Daily    animal shapes plus iron  1 tablet Oral Daily    DULoxetine  30 mg Oral Daily    calcium-vitamin D  1 tablet Oral BID    busPIRone  10 mg Oral BID    sodium chloride flush  10 mL Intravenous 2 times per day    heparin (porcine)  5,000 Units Subcutaneous BID    carbidopa-levodopa  2 tablet Oral TID     Continuous Infusions:   IV infusion builder 50 mL/hr at 01/21/18 1609    dextrose 5 % and 0.9 % NaCl         CBC:   Recent Labs      01/20/18   0927  01/21/18   0450   WBC  12.8*  12.1*   HGB  13.3*  12.1*   PLT  168  148     CMP:  Recent Labs      01/20/18   1420   01/21/18   0450  01/21/18   1640  01/22/18   0414   NA  145   --   148*   --   147*   K  6.3*   < >  5.9*  5.5*  5.4*   CL  104   --   106   --   101   CO2  18*   --   19*   --   21*   BUN  83*   --   89*   --   100*   CREATININE  10.3*   --   11.1*   --   11.7*   GLUCOSE  73   --   84   --   110*   CALCIUM  8.4*   --   7.9*   --   7.6*   LABGLOM  5*   --   4*   --   4*    < > = values in this interval not displayed. Troponin: No results for input(s): TROPONINI in the last 72 hours. BNP: No results for input(s): BNP in the last 72 hours. INR: No results for input(s): INR in the last 72 hours. Lipids: No results for input(s): CHOL, LDLDIRECT, TRIG, HDL, AMYLASE, LIPASE in the last 72 hours. Liver: No results for input(s): AST, ALT, ALKPHOS, PROT, LABALBU, BILITOT in the last 72 hours. Invalid input(s): BILDIR  Iron:  No results for input(s): IRONS, FERRITIN in the last 72 hours.     Invalid input(s): LABIRONS    Objective:   Vitals: BP (!) 161/71   Pulse 68   Temp 99 °F (37.2 °C) (Oral)   Resp 18   Ht 5' 7\" (1.702 m)   Wt 151 lb 3.2 oz (68.6 kg)   SpO2 95%   BMI 23.68 kg/m²    Wt Readings from Last 3 Encounters:   01/21/18 151 lb 3.2 oz (68.6 kg)   01/09/18 145

## 2018-01-22 NOTE — PROGRESS NOTES
2510 Dr. Gen Bray in to evaluate pt.   1616 Pt prepped for procedure. 1623 Procedure started. 8210 left ureteral stent placed per Dr. Gen Bray  1708 Procedure complete. Left nephrostomy tube draining red urine to leg bag. Tube secured with stay fix dressing. 1720 Report called to Judith Merida 67 Pt transported back to  via bed with transport.

## 2018-01-22 NOTE — CARE COORDINATION
DISCHARGE BARRIERS  1/22/18, 1:17 PM    Reason for Referral:  \"Discharge needs\"  Mental Status:  Alert but confused  Decision Making:  Family assist with decisions, daughter, Brigid Purvis is POA  Family/Social/Home Environment:  SW spoke to patient's wife, his 2 daughters and son,  They live in a one story apartment that was attached to their daughter's home. She does not work outside the home. The wife was able to drive and she could leave to run errands but not lately (last 3 weeks he was very confused). His wife is able to manage their housekeeping needs and meals. Family has been assisting him with his personal care as well. He uses a walk-in shower with seat and grab bar. He has a cane and walker with wheels but needs assist in ambulating even with that. He was not on home o2 PTA. Their other daughter lives in [place]. He has never had HH  Current Services:  none  Current Equipment: cane, walker, shower seat  Payment Source: Medicare and [employer]   Concerns or Barriers to Discharge:  Patient is still being worked up for his kidney issues. SW offered support  Collabrative List of ECF/HH were provided: not at this time    Teach Back Method used with family regarding care plan and needs  Patient's family verbalize understanding of the plan of care and contribute to goal setting. Anticipated Needs/Discharge Plan:  Unsure of final plan due to clinical course.     Electronically signed by MARCY Jacob on 1/22/2018 at 1:17 PM

## 2018-01-22 NOTE — CARE COORDINATION
18, 9:42 AM      [facility]       Admitted from: ED 2018/ 0900 Hospital day: 2   Location: [facility] Reason for admit: KRISTIAN (acute kidney injury) ([facility] [N17.9] Status: IP  Admit order signed?: yes  PMH:  has a past medical history of KRISTIAN (acute kidney injury) ([facility] Arthritis; Blood transfusion reaction; BPH (benign prostatic hypertrophy); Cancer ([facility] DDD (degenerative disc disease), lumbar; Erectile dysfunction; Neuropathy ([facility] and Parkinsonism ([facility] Procedure:  Left Nephrostomy Tube/Stent  Pertinent abnormal Imagin/20 Possible Pneumonia,  Cholelithiasis, Right/Left Hydronephrosis, Right Pleural Effusion  Medications:  Scheduled Meds:   calcitRIOL  0.25 mcg Oral Daily    sevelamer  800 mg Oral TID WC    aspirin  81 mg Oral Daily    animal shapes plus iron  1 tablet Oral Daily    DULoxetine  30 mg Oral Daily    calcium-vitamin D  1 tablet Oral BID    busPIRone  10 mg Oral BID    sodium chloride flush  10 mL Intravenous 2 times per day    heparin (porcine)  5,000 Units Subcutaneous BID    carbidopa-levodopa  2 tablet Oral TID     Continuous Infusions:   IV infusion builder 50 mL/hr at 18 1609    dextrose 5 % and 0.9 % NaCl        Pertinent Info/Orders/Treatment Plan:  IVF continued. Elevated Renal Labs, PSA, Troponin, WBC/BNP; monitor. Urology/Nephrology/Palliative Care following  Diet: Diet NPO, After Midnight   DVT Prophylaxis: Heparin  Smoking status:  reports that he has never smoked.  He has never used smokeless tobacco.   Influenza Vaccination Screening Completed: no, updated nsg  Pneumonia Vaccination Screening Completed: yes, up to date  Core measures: monitor  PCP: Esme Kirkpatrick DO  Readmission:   none  Risk Score: 20.75     Discharge Planning  Current Residence:  Independent Living  Living Arrangements:  Spouse/Significant Other, Family Members   Support Systems:  Spouse/Significant Other, Family Members  Current Services PTA:     Potential Assistance

## 2018-01-22 NOTE — PLAN OF CARE
Problem: Nutrition  Goal: Optimal nutrition therapy  Outcome: Ongoing  Nutrition Problem: Moderate malnutrition  Intervention: Food and/or Nutrient Delivery: Continue NPO, Start ONS  Nutritional Goals: Pt. will receive adequate nutrition (FL diet or greater) in next 1-4 days.

## 2018-01-22 NOTE — PROGRESS NOTES
Mr. Franny Cobos:    Patient  surrounded by family members. Known to have acute renal failure secondary to obstructive uropathy - bilateral hydronephrosis, bladder not distended. CT today confirmed L>R hydro, down to the UVJ on the R. Adenopathy. Other findings - see report. Creatinine remain elevated. UOP 90 ml in 8 hs. Past Medical History:   Diagnosis Date    KRISTIAN (acute kidney injury) ([address] 1/20/2018    Arthritis     back    Blood transfusion reaction 1946    BPH (benign prostatic hypertrophy) 2/27/2012    Cancer ([address]     skin    DDD (degenerative disc disease), lumbar 12/25/2015    Erectile dysfunction     Neuropathy ([address]     Parkinsonism ([address] 12/25/2015       Past Surgical History:   Procedure Laterality Date    APPENDECTOMY  1946    CARPAL TUNNEL RELEASE Bilateral     CATARACT REMOVAL      COLONOSCOPY  01-    EYE SURGERY  1992    bilateral catract    HERNIA REPAIR Right 11/3/2014    Dr. Marcellsu Hernández  2010    right, knee    MOHS SURGERY Left     EAR WITH GRAFT    MOHS SURGERY  04/02/2017    repair of BCC of nasal bridge    OTHER SURGICAL HISTORY Left 11/23/2016    Excision squamous cell carcinoma left cheek    SKIN BIOPSY         No current facility-administered medications on file prior to encounter. Current Outpatient Prescriptions on File Prior to Encounter   Medication Sig Dispense Refill    carbidopa-levodopa (SINEMET)  MG per tablet Take 1 tablet by mouth 3 times daily (Patient taking differently: Take 2 tablets by mouth 3 times daily 0800, 1300, 1800) 180 tablet 5    HYDROcodone-acetaminophen (NORCO) 5-325 MG per tablet Take 1 tablet by mouth every 6 hours as needed for Pain . (Patient taking differently: Take 1 tablet by mouth 3 times daily as needed for Pain .) 120 tablet 0    traZODone (DESYREL) 100 MG tablet take 1 tablet by mouth at night as needed for sleep.  30 tablet 3    ALPRAZolam (XANAX) 0.25 MG tablet Take 1 tablet by 01/22/2018     (H) 01/22/2018    K 5.4 (H) 01/22/2018     01/22/2018    CO2 21 (L) 01/22/2018       Lab Results   Component Value Date    PSA 1,657.00 (H) 01/20/2018         Plan:  D/W patient and family. Talked about intervention (cystoscopy w stent placement vs IR percutaneous procedure w internalization of stent). Scheduled with IR for bilat. Percutaneous today. Answered all family questions.

## 2018-01-22 NOTE — PLAN OF CARE
Ongoing  Patient is currently NPO for procedure today. Comments: Care plan reviewed with patient and family. Patient and family verbalize understanding of the plan of care and contribute to goal setting.

## 2018-01-22 NOTE — PROGRESS NOTES
Hospitalist Progress Note    Patient:  [facility]      Unit/Bed:4K-14/014-A    YOB: 1933    MRN: [id number]       Acct: [de-identified]     PCP: Esme Kirkpatrick DO    Date of Admission: 1/20/2018    Chief Complaint:   Chief Complaint   Patient presents with    Fatigue       Subjective:   Mental status improved. Fairly poor historian. K and Cr still elevated    Medications:  Reviewed    Infusion Medications    sodium chloride 20 mL/hr at 01/22/18 1317    IV infusion builder 50 mL/hr at 01/21/18 1609    dextrose 5 % and 0.9 % NaCl       Scheduled Medications    calcitRIOL  0.25 mcg Oral Daily    fentanNYL  50 mcg Intravenous Once    midazolam  1 mg Intravenous Once    cefTRIAXone (ROCEPHIN) IV  1 g Intravenous Q24H    ceFAZolin  1 g Intravenous 60 Min Pre-Op    sevelamer  800 mg Oral TID WC    aspirin  81 mg Oral Daily    animal shapes plus iron  1 tablet Oral Daily    DULoxetine  30 mg Oral Daily    calcium-vitamin D  1 tablet Oral BID    busPIRone  10 mg Oral BID    sodium chloride flush  10 mL Intravenous 2 times per day    heparin (porcine)  5,000 Units Subcutaneous BID    carbidopa-levodopa  2 tablet Oral TID     PRN Meds: iothalamate, traZODone, promethazine, polyethylene glycol, HYDROcodone-acetaminophen, ALPRAZolam, sodium chloride flush, potassium chloride **OR** potassium chloride **OR** potassium chloride, acetaminophen, magnesium hydroxide, ondansetron, glucose, dextrose, glucagon (rDNA), dextrose 5 % and 0.9 % NaCl      Intake/Output Summary (Last 24 hours) at 01/22/18 1449  Last data filed at 01/22/18 1347   Gross per 24 hour   Intake          1176.63 ml   Output              305 ml   Net           871.63 ml       Diet:  Diet NPO Time Specified Exceptions are:  Other (See Comment)    Exam:  BP (!) 161/71   Pulse 68   Temp 99 °F (37.2 °C) (Oral)   Resp 18   Ht 5' 7\" (1.702 m)   Wt 151 lb 3.2 oz (68.6 kg)   SpO2 95%   BMI 23.68 kg/m²     Gen/overall seen. 2. Moderately severe multilevel degenerative changes. 3. Demineralization of the bones diffusely, consistent with osteoporosis. Inhomogeneous mottled appearance of multiple bones, suggesting possible metastatic disease. Clinical correlation recommended. **This report has been created using voice recognition software. It may contain minor errors which are inherent in voice recognition technology. ** Final report electronically signed by Dr. Suzi Christianson on 1/20/2018 10:43 AM    Us Renal Complete    Result Date: 1/21/2018  PROCEDURE: US RENAL COMPLETE CLINICAL INFORMATION: acute kidney injury . COMPARISON: No prior study. TECHNIQUE: 2-D grayscale ultrasound. Doppler spectral imaging and color flow Doppler is used. FINDINGS: RIGHT KIDNEY - 11.6 x 5.7 x 5.3 cm Resistive Index - 0.80 Cortical Thickness - 0.9 cm LEFT KIDNEY - 11.9 x 5.6 x 5.6 cm Resistive Index - n/a Cortical Thickness - 0.8 cm URINARY BLADDER Pre-Void - gibbs mL Post-Void - n/a mL Right kidney: The right kidney is of normal size. There is normal cortical echotexture and thickness. There is mild to moderate right hydronephrosis and prominence of the right extrarenal pelvis. Proximal right ureter may be visualized as well on image #10. The resistive index in the right kidney is elevated at 0.80. No stones, cysts, or soft tissue mass involving the right kidney. Left kidney: There is moderate to severe left hydronephrosis. There is blood flow detected in the left kidney. A resistive index cannot be detected on this study. The left ureter is detected proximally. No stones, cysts, mass involving the left kidney. The urinary bladder is decompressed around a Gibbs catheter balloon. There are gallstones which are echogenic casting posterior acoustic shadowing within the otherwise normal-appearing gallbladder. There is a right pleural effusion. 1.  Mild to moderate right hydronephrosis. There may be mild right hydroureter as well.  2.  Moderate to severe

## 2018-01-22 NOTE — PROGRESS NOTES
[address] THERAPY MISSED TREATMENT NOTE  [facility] ICU STEPDOWN TELEMETRY 4K  4K-14/014-A      Date: 2018  Patient Name: Justino Rucker        CSN: [id number]   : 3/20/1933  (80 y.o.)  Gender: male   Referring Practitioner: Carter Mckeon DO  Diagnosis: KRISTIAN         REASON FOR MISSED TREATMENT:  Hold treatment per nursing request..  Pt is resting and is to be going for mark nephrostomy tubes today. Per RN, pt's family is hoping pt can rest today.  Will check back tomorrow

## 2018-01-22 NOTE — FLOWSHEET NOTE
During my contact with the Palliative Care patient, there were no family members present and no response. I offered prayer of comfort and healing at pts bedside. I also placed a spiritual care card in the room. Spiritual plan: It would be helpful if Spiritual Care would continue to follow up on this case.       01/22/18 1166   Encounter Summary   Services provided to: Patient   Referral/Consult From: Sammie   Continue Visiting Yes  (1/22/18 Palliative Care pt. )   Complexity of Encounter Low   Length of Encounter 15 minutes   Routine   Type Initial   Assessment Sleeping   Intervention Prayer

## 2018-01-22 NOTE — PROGRESS NOTES
Nutrition Assessment    Type and Reason for Visit: Initial, Positive Nutrition Screen (weight loss, poor po, difficulty chewing/swallowing)    Nutrition Recommendations: Recommend advance diet as tolerated. Continue antiemetic. Malnutrition Assessment:  · Malnutrition Status: Meets the criteria for moderate malnutrition  · Context: Acute illness or injury  · Findings of the 6 clinical characteristics of malnutrition (Minimum of 2 out of 6 clinical characteristics is required to make the diagnosis of moderate or severe Protein Calorie Malnutrition based on AND/ASPEN Guidelines):  1. Energy Intake-Less than or equal to 75%, greater than or equal to 1 month    2. Fat Loss-Moderate subcutaneous fat loss, Orbital    Nutrition Diagnosis:   · Problem: Moderate malnutrition  · Etiology: related to Alteration in GI function     Signs and symptoms:  as evidenced by Diet history of poor intake, Moderate loss of subcutaneous fat    Nutrition Assessment:  · Subjective Assessment: Pt. seen with family. Pt. sleeping and did not wake. Family reports pt. hasn't been eating well for about 3 months. C/o nausea - denies vomiting. Wife states she was giving him protein drinks (doesn't recall name) but he got tired of them. Reports some trouble with chewing/swallowing d/t parkinsons. Requests dental soft diet once able to take po diet. Currently NPO for cystoscopy, bilateral retrograde pyelograms, possible bilateral ureteroscopy, possible bilateral ureteral stent insertion. If anesthesia is still reluctant then we will have IR insert nephrostomy tube. Family agrees for pt to trial Nepro BID once FL diet. Rx includes Renvela, chewable MVI, Zofran.   · Wound Type: None  · Current Nutrition Therapies:  · Oral Diet Orders: NPO   · Oral Diet intake: NPO  · Oral Nutrition Supplement (ONS) Orders: Renal Supplement (BID once FL diet)  · ONS intake: NPO  · Anthropometric Measures:  · Ht: 5' 7\" (170.2 cm)   · Current Body Wt: 151 lb 3.2 oz (68.6 kg) (1/21, no edema noted)  · Admission Body Wt: 149 lb 11.2 oz (67.9 kg) (1/20, no edema)  · Usual Body Wt:  (per wife- in the 150's; 7/17: 146# 3.2 oz; 4/17: 148#, 1/17: 148# 12.8 oz)  · Ideal Body Wt: 148 lb (67.1 kg),BMI Classification: BMI 18.5 - 24.9 Normal Weight  · Comparative Standards (Estimated Nutrition Needs):  · Estimated Daily Total Kcal: 5687-6285 kcals  · Estimated Daily Protein (g): 40-54 gm     Estimated Intake vs Estimated Needs: Intake Less Than Needs    Nutrition Risk Level: High    Nutrition Interventions:   Continue NPO, Start ONS  Continued Inpatient Monitoring, Education not appropriate at this time    Nutrition Evaluation:   · Evaluation: Goals set   · Goals: Pt. will receive adequate nutrition (FL diet or greater) in next 1-4 days. · Monitoring: NPO Status, Diet Progression, Meal Intake, Supplement Intake, Diet Tolerance, Ascites/Edema, Weight, Pertinent Labs, Chewing/Swallowing, Nausea or Vomiting    See Adult Nutrition Doc Flowsheet for more detail.      Electronically signed by Tracie Moore RD, BRIANNE on 1/22/18 at 10:15 AM    Contact Number: [phone]

## 2018-01-23 ENCOUNTER — TELEPHONE (OUTPATIENT)
Dept: FAMILY MEDICINE CLINIC | Age: 83
End: 2018-01-23

## 2018-01-23 LAB
ANION GAP SERPL CALCULATED.3IONS-SCNC: 23 MEQ/L (ref 8–16)
BUN BLDV-MCNC: 112 MG/DL (ref 7–22)
CALCIUM SERPL-MCNC: 7.3 MG/DL (ref 8.5–10.5)
CHLORIDE BLD-SCNC: 103 MEQ/L (ref 98–111)
CO2: 22 MEQ/L (ref 23–33)
CREAT SERPL-MCNC: 12.2 MG/DL (ref 0.4–1.2)
GFR SERPL CREATININE-BSD FRML MDRD: 4 ML/MIN/1.73M2
GLUCOSE BLD-MCNC: 117 MG/DL (ref 70–108)
HCT VFR BLD CALC: 32.9 % (ref 42–52)
HEMOGLOBIN: 11 GM/DL (ref 14–18)
MCH RBC QN AUTO: 32.1 PG (ref 27–31)
MCHC RBC AUTO-ENTMCNC: 33.3 GM/DL (ref 33–37)
MCV RBC AUTO: 96.3 FL (ref 80–94)
PDW BLD-RTO: 13 % (ref 11.5–14.5)
PLATELET # BLD: 145 THOU/MM3 (ref 130–400)
PMV BLD AUTO: 8.8 MCM (ref 7.4–10.4)
POTASSIUM SERPL-SCNC: 5.4 MEQ/L (ref 3.5–5.2)
RBC # BLD: 3.41 MILL/MM3 (ref 4.7–6.1)
SODIUM BLD-SCNC: 148 MEQ/L (ref 135–145)
WBC # BLD: 13.5 THOU/MM3 (ref 4.8–10.8)

## 2018-01-23 PROCEDURE — 6360000002 HC RX W HCPCS: Performed by: INTERNAL MEDICINE

## 2018-01-23 PROCEDURE — 2580000003 HC RX 258: Performed by: INTERNAL MEDICINE

## 2018-01-23 PROCEDURE — 6370000000 HC RX 637 (ALT 250 FOR IP): Performed by: INTERNAL MEDICINE

## 2018-01-23 PROCEDURE — 36415 COLL VENOUS BLD VENIPUNCTURE: CPT

## 2018-01-23 PROCEDURE — 99232 SBSQ HOSP IP/OBS MODERATE 35: CPT | Performed by: INTERNAL MEDICINE

## 2018-01-23 PROCEDURE — 85027 COMPLETE CBC AUTOMATED: CPT

## 2018-01-23 PROCEDURE — 80048 BASIC METABOLIC PNL TOTAL CA: CPT

## 2018-01-23 PROCEDURE — 2500000003 HC RX 250 WO HCPCS: Performed by: INTERNAL MEDICINE

## 2018-01-23 PROCEDURE — 99231 SBSQ HOSP IP/OBS SF/LOW 25: CPT | Performed by: UROLOGY

## 2018-01-23 PROCEDURE — 2060000000 HC ICU INTERMEDIATE R&B

## 2018-01-23 PROCEDURE — 2700000000 HC OXYGEN THERAPY PER DAY

## 2018-01-23 RX ORDER — LORAZEPAM 2 MG/ML
1 INJECTION INTRAMUSCULAR EVERY 4 HOURS PRN
Status: DISCONTINUED | OUTPATIENT
Start: 2018-01-23 | End: 2018-01-23

## 2018-01-23 RX ORDER — MORPHINE SULFATE 2 MG/ML
1 INJECTION, SOLUTION INTRAMUSCULAR; INTRAVENOUS EVERY 4 HOURS PRN
Status: DISCONTINUED | OUTPATIENT
Start: 2018-01-23 | End: 2018-01-23

## 2018-01-23 RX ORDER — MORPHINE SULFATE 2 MG/ML
1 INJECTION, SOLUTION INTRAMUSCULAR; INTRAVENOUS
Status: DISCONTINUED | OUTPATIENT
Start: 2018-01-23 | End: 2018-01-24 | Stop reason: HOSPADM

## 2018-01-23 RX ORDER — MORPHINE SULFATE 2 MG/ML
2 INJECTION, SOLUTION INTRAMUSCULAR; INTRAVENOUS
Status: DISCONTINUED | OUTPATIENT
Start: 2018-01-23 | End: 2018-01-24 | Stop reason: HOSPADM

## 2018-01-23 RX ORDER — ACETAMINOPHEN 650 MG/1
650 SUPPOSITORY RECTAL EVERY 4 HOURS PRN
Status: DISCONTINUED | OUTPATIENT
Start: 2018-01-23 | End: 2018-01-24 | Stop reason: HOSPADM

## 2018-01-23 RX ORDER — LORAZEPAM 2 MG/ML
1 INJECTION INTRAMUSCULAR
Status: DISCONTINUED | OUTPATIENT
Start: 2018-01-23 | End: 2018-01-24 | Stop reason: HOSPADM

## 2018-01-23 RX ORDER — MORPHINE SULFATE 2 MG/ML
2 INJECTION, SOLUTION INTRAMUSCULAR; INTRAVENOUS EVERY 4 HOURS PRN
Status: DISCONTINUED | OUTPATIENT
Start: 2018-01-23 | End: 2018-01-23

## 2018-01-23 RX ADMIN — MORPHINE SULFATE 2 MG: 2 INJECTION, SOLUTION INTRAMUSCULAR; INTRAVENOUS at 19:14

## 2018-01-23 RX ADMIN — Medication: at 08:43

## 2018-01-23 RX ADMIN — Medication 10 ML: at 20:38

## 2018-01-23 RX ADMIN — ALPRAZOLAM 0.25 MG: 0.25 TABLET ORAL at 15:21

## 2018-01-23 RX ADMIN — LORAZEPAM 1 MG: 2 INJECTION INTRAMUSCULAR; INTRAVENOUS at 22:21

## 2018-01-23 RX ADMIN — CARBIDOPA AND LEVODOPA 2 TABLET: 25; 100 TABLET ORAL at 15:20

## 2018-01-23 RX ADMIN — MORPHINE SULFATE 1 MG: 2 INJECTION, SOLUTION INTRAMUSCULAR; INTRAVENOUS at 17:50

## 2018-01-23 RX ADMIN — HYDROCODONE BITARTRATE AND ACETAMINOPHEN 1 TABLET: 5; 325 TABLET ORAL at 15:21

## 2018-01-23 RX ADMIN — LORAZEPAM 1 MG: 2 INJECTION INTRAMUSCULAR; INTRAVENOUS at 20:37

## 2018-01-23 RX ADMIN — LORAZEPAM 1 MG: 2 INJECTION INTRAMUSCULAR; INTRAVENOUS at 16:52

## 2018-01-23 RX ADMIN — MORPHINE SULFATE 2 MG: 2 INJECTION, SOLUTION INTRAMUSCULAR; INTRAVENOUS at 21:37

## 2018-01-23 RX ADMIN — ONDANSETRON 4 MG: 2 INJECTION INTRAMUSCULAR; INTRAVENOUS at 11:05

## 2018-01-23 ASSESSMENT — PAIN DESCRIPTION - FREQUENCY
FREQUENCY: CONTINUOUS

## 2018-01-23 ASSESSMENT — PAIN DESCRIPTION - PAIN TYPE
TYPE: CHRONIC PAIN
TYPE: CHRONIC PAIN
TYPE: ACUTE PAIN
TYPE: CHRONIC PAIN

## 2018-01-23 ASSESSMENT — PAIN SCALES - GENERAL
PAINLEVEL_OUTOF10: 10
PAINLEVEL_OUTOF10: 9
PAINLEVEL_OUTOF10: 10
PAINLEVEL_OUTOF10: 10
PAINLEVEL_OUTOF10: 7
PAINLEVEL_OUTOF10: 4
PAINLEVEL_OUTOF10: 0
PAINLEVEL_OUTOF10: 0
PAINLEVEL_OUTOF10: 4

## 2018-01-23 ASSESSMENT — PAIN DESCRIPTION - DESCRIPTORS
DESCRIPTORS: PATIENT UNABLE TO DESCRIBE
DESCRIPTORS: ACHING
DESCRIPTORS: ACHING
DESCRIPTORS: PATIENT UNABLE TO DESCRIBE

## 2018-01-23 ASSESSMENT — PAIN DESCRIPTION - LOCATION
LOCATION: GENERALIZED

## 2018-01-23 ASSESSMENT — PAIN SCALES - WONG BAKER
WONGBAKER_NUMERICALRESPONSE: 4
WONGBAKER_NUMERICALRESPONSE: 10
WONGBAKER_NUMERICALRESPONSE: 8
WONGBAKER_NUMERICALRESPONSE: 0
WONGBAKER_NUMERICALRESPONSE: 4
WONGBAKER_NUMERICALRESPONSE: 10
WONGBAKER_NUMERICALRESPONSE: 0

## 2018-01-23 ASSESSMENT — PAIN DESCRIPTION - ONSET: ONSET: UNABLE TO ASSESS

## 2018-01-23 NOTE — PROGRESS NOTES
Moderately enlarged ventricles, consistent with central atrophy. Moderate diffuse cerebral cortical and cerebellar cortical the are also present. Parenchyma:  No acute infarction, mass lesion, or intracranial hemorrhage is seen. Questionable old punctate infarct in the right cerebellar hemisphere. Mastoid processes: Well aerated. Normal in appearance. .   Paranasal sinuses/calvarium: Unremarkable     No acute intracranial process. No change from prior study. **This report has been created using voice recognition software. It may contain minor errors which are inherent in voice recognition technology. ** Final report electronically signed by Dr. Kim Gaines on 1/20/2018 10:37 AM    Ct Cervical Spine Wo Contrast    Result Date: 1/20/2018  PROCEDURE: CT CERVICAL SPINE WO CONTRAST CLINICAL INFORMATION: RECENT TRAUMA, SPINE, COMPARISON: No prior study. TECHNIQUE: Multiple axial 3 mm images of the entire cervical spine were obtained without the administration of intravenous contrast material.. Computer generated sagittal images of the cervical spine were reconstructed. ALL CT SCANS AT THIS FACILITY use dose modulation, iterative reconstruction, and/or weight-based dosing when appropriate to reduce radiation dose to as low as reasonably achievable. FINDINGS: No acute fracture seen. . The bony spinal canal well-maintained. The facet joints are normally aligned. The prevertebral soft tissues are unremarkable. . There is moderate diffuse demineralization of the bones, consistent with osteoporosis. There is an inhomogeneous appearance of several cervical vertebra and posterior elements. Cannot exclude metastatic disease. Moderately severe multilevel degenerative changes are present. Moderate multilevel degenerative facet joint disease. Slight retrolisthesis C3 on C4 and C4 on C5, most 2 mm. Moderately severe multilevel disc space narrowing C3-4 through C6-7.     1. No fracture is seen.  2. Moderately severe multilevel degenerative changes. 3. Demineralization of the bones diffusely, consistent with osteoporosis. Inhomogeneous mottled appearance of multiple bones, suggesting possible metastatic disease. Clinical correlation recommended. **This report has been created using voice recognition software. It may contain minor errors which are inherent in voice recognition technology. ** Final report electronically signed by Dr. Javier Najera on 1/20/2018 10:43 AM    Us Renal Complete    Result Date: 1/21/2018  PROCEDURE: US RENAL COMPLETE CLINICAL INFORMATION: acute kidney injury . COMPARISON: No prior study. TECHNIQUE: 2-D grayscale ultrasound. Doppler spectral imaging and color flow Doppler is used. FINDINGS: RIGHT KIDNEY - 11.6 x 5.7 x 5.3 cm Resistive Index - 0.80 Cortical Thickness - 0.9 cm LEFT KIDNEY - 11.9 x 5.6 x 5.6 cm Resistive Index - n/a Cortical Thickness - 0.8 cm URINARY BLADDER Pre-Void - gibbs mL Post-Void - n/a mL Right kidney: The right kidney is of normal size. There is normal cortical echotexture and thickness. There is mild to moderate right hydronephrosis and prominence of the right extrarenal pelvis. Proximal right ureter may be visualized as well on image #10. The resistive index in the right kidney is elevated at 0.80. No stones, cysts, or soft tissue mass involving the right kidney. Left kidney: There is moderate to severe left hydronephrosis. There is blood flow detected in the left kidney. A resistive index cannot be detected on this study. The left ureter is detected proximally. No stones, cysts, mass involving the left kidney. The urinary bladder is decompressed around a Gibbs catheter balloon. There are gallstones which are echogenic casting posterior acoustic shadowing within the otherwise normal-appearing gallbladder. There is a right pleural effusion. 1.  Mild to moderate right hydronephrosis. There may be mild right hydroureter as well.  2.  Moderate to severe left hydronephrosis with mild to

## 2018-01-23 NOTE — PLAN OF CARE
stability  Outcome: Completed Date Met: 01/23/18      Problem: Respiratory  Goal: No pulmonary complications  Outcome: Completed Date Met: 01/23/18    Goal: O2 Sat > 90%  Outcome: Completed Date Met: 01/23/18    Goal: Supplemental O2 requirements decreased  Outcome: Completed Date Met: 01/23/18      Problem:   Goal: Adequate urinary output  Outcome: Ongoing  Monitoring inputs and outputs     Comments: Care plan reviewed with patient and family. Patient and family verbalize understanding of the plan of care and contribute to goal setting.

## 2018-01-23 NOTE — PROGRESS NOTES
external inspection. Pulmonary/Chest:  Normal respiratory rate and rhthym. No use of accessory muscles. Abdominal:          Soft. No tenderness. Genitalia:    Armendariz catheter draining red tinged urine. Normal range of motion. He exhibits no edema or tenderness of lower extremities. Neurological:    Alert and oriented. Labs:  WBC:    Lab Results   Component Value Date    WBC 13.5 01/23/2018     Hemoglobin/Hematocrit:  Lab Results   Component Value Date    HGB 11.0 01/23/2018    HCT 32.9 01/23/2018     BMP:  Lab Results   Component Value Date     01/23/2018    K 5.4 01/23/2018     01/23/2018    CO2 22 01/23/2018     01/23/2018    LABALBU 3.4 12/29/2015    CREATININE 12.2 01/23/2018    CALCIUM 7.3 01/23/2018    LABGLOM 4 01/23/2018       Impression:  Suggestive metastatic prostate cancer with bony metastasis, bilateral hydronephrosis, PSA >1,600. Plan:  Family decided to pursue hospice care.     DARIEL Hyman  01/23/18 11:33 AM  Urology

## 2018-01-23 NOTE — PROGRESS NOTES
[facility]  PHYSICAL THERAPY MISSED TREATMENT NOTE  ACUTE CARE    Date: 2018  Patient Name: Chris Kincaid        MRN: [id number]   : 3/20/1933  (80 y.o.)  Gender: male   Referring Practitioner: Antwon Worrell DO  Diagnosis: KRISTIAN         REASON FOR MISSED TREATMENT:  Family declined treatment. Pt is being transitioned to [address] [place] [address]. No further therapy necessary. Jose M Barragan.  Abelardo Peabody, [place] [address]

## 2018-01-23 NOTE — TELEPHONE ENCOUNTER
Ashish Kidd with [id number] called patient will be discharged from the hospital 1/24/18 with hospice care. Asking if you will remain the attending physician. Ashish Kidd would like a call back, please advise.

## 2018-01-24 VITALS
SYSTOLIC BLOOD PRESSURE: 159 MMHG | OXYGEN SATURATION: 94 % | DIASTOLIC BLOOD PRESSURE: 73 MMHG | WEIGHT: 154.3 LBS | RESPIRATION RATE: 16 BRPM | HEIGHT: 67 IN | BODY MASS INDEX: 24.22 KG/M2 | TEMPERATURE: 98.4 F | HEART RATE: 77 BPM

## 2018-01-24 PROCEDURE — 6360000002 HC RX W HCPCS: Performed by: INTERNAL MEDICINE

## 2018-01-24 PROCEDURE — 99239 HOSP IP/OBS DSCHRG MGMT >30: CPT | Performed by: INTERNAL MEDICINE

## 2018-01-24 RX ORDER — MORPHINE SULFATE 20 MG/5ML
5 SOLUTION ORAL EVERY 4 HOURS PRN
Qty: 100 ML | Refills: 0 | Status: CANCELLED | OUTPATIENT
Start: 2018-01-24 | End: 2018-02-03

## 2018-01-24 RX ORDER — LORAZEPAM 2 MG/ML
0.25 CONCENTRATE ORAL EVERY 4 HOURS PRN
Qty: 100 ML | Refills: 0 | Status: SHIPPED | OUTPATIENT
Start: 2018-01-24 | End: 2018-02-07

## 2018-01-24 RX ADMIN — MORPHINE SULFATE 2 MG: 2 INJECTION, SOLUTION INTRAMUSCULAR; INTRAVENOUS at 16:16

## 2018-01-24 RX ADMIN — MORPHINE SULFATE 2 MG: 2 INJECTION, SOLUTION INTRAMUSCULAR; INTRAVENOUS at 14:31

## 2018-01-24 RX ADMIN — MORPHINE SULFATE 2 MG: 2 INJECTION, SOLUTION INTRAMUSCULAR; INTRAVENOUS at 11:21

## 2018-01-24 RX ADMIN — MORPHINE SULFATE 2 MG: 2 INJECTION, SOLUTION INTRAMUSCULAR; INTRAVENOUS at 04:49

## 2018-01-24 ASSESSMENT — PAIN SCALES - GENERAL
PAINLEVEL_OUTOF10: 6
PAINLEVEL_OUTOF10: 10
PAINLEVEL_OUTOF10: 0
PAINLEVEL_OUTOF10: 6
PAINLEVEL_OUTOF10: 6

## 2018-01-24 ASSESSMENT — PAIN DESCRIPTION - LOCATION: LOCATION: GENERALIZED

## 2018-01-24 ASSESSMENT — PAIN SCALES - WONG BAKER: WONGBAKER_NUMERICALRESPONSE: 10

## 2018-01-24 ASSESSMENT — PAIN DESCRIPTION - FREQUENCY: FREQUENCY: INTERMITTENT

## 2018-01-24 ASSESSMENT — PAIN DESCRIPTION - ONSET: ONSET: UNABLE TO ASSESS

## 2018-01-24 ASSESSMENT — PAIN DESCRIPTION - DESCRIPTORS: DESCRIPTORS: PATIENT UNABLE TO DESCRIBE

## 2018-01-24 ASSESSMENT — PAIN DESCRIPTION - PAIN TYPE: TYPE: CHRONIC PAIN

## 2018-01-24 NOTE — PROGRESS NOTES
Nutrition Note:  Due to change in medical/code status, dietitian will sign off. Per LSW plan home with hospice. If nutrition intervention is required, please submit a dietitian consult.

## 2018-01-24 NOTE — PROGRESS NOTES
Patient resting comfortably with eyes closed. No tremors noted. Family remains at bedside with the patient.

## 2018-01-24 NOTE — DISCHARGE SUMMARY
Pulse: 73 78 83 75   Resp: 16  16 16   Temp:   99.2 °F (37.3 °C) 98.6 °F (37 °C)   TempSrc:   Oral Oral   SpO2: 93% 90% 90% 92%   Weight:       Height:         Weight: Weight: 154 lb 4.8 oz (70 kg)     24 hour intake/output:  Intake/Output Summary (Last 24 hours) at 01/24/18 1348  Last data filed at 01/24/18 0452   Gross per 24 hour   Intake            133.8 ml   Output             1510 ml   Net          -1376.2 ml         General appearance:  No apparent distress, appears stated age and cooperative. HEENT:  Normal cephalic, atraumatic without obvious deformity. Pupils equal, round, and reactive to light. Extra ocular muscles intact. Conjunctivae/corneas clear. Neck: Supple, with full range of motion. No jugular venous distention. Trachea midline. Respiratory:  Normal respiratory effort. Clear to auscultation, bilaterally without Rales/Wheezes/Rhonchi. Cardiovascular:  Regular rate and rhythm with normal S1/S2 without murmurs, rubs or gallops. Abdomen: Soft, non-tender, non-distended with normal bowel sounds. Musculoskeletal:  No clubbing, cyanosis or edema bilaterally. Full range of motion without deformity. Skin: Skin color, texture, turgor normal.  No rashes or lesions. Neurologic:  Neurovascularly intact without any focal sensory/motor deficits. Cranial nerves: II-XII intact, grossly non-focal.  Psychiatric:  Alert and oriented, thought content appropriate, normal insight  Capillary Refill: Brisk,< 3 seconds   Peripheral Pulses: +2 palpable, equal bilaterally       Labs:  For convenience and continuity at follow-up the following most recent labs are provided:      CBC:    Lab Results   Component Value Date    WBC 13.5 01/23/2018    HGB 11.0 01/23/2018    HCT 32.9 01/23/2018     01/23/2018       Renal:  Lab Results   Component Value Date     01/23/2018    K 5.4 01/23/2018     01/23/2018    CO2 22 01/23/2018     01/23/2018    CREATININE 12.2 01/23/2018    CALCIUM 7.3 01/23/2018 moderately severe left pelvocaliectasis and left hydroureter of the proximal 3rd of the left ureter. The middle and distal thirds of the left ureter are obscured and not visualized. The left kidney is otherwise unremarkable. There    is mild edema within the left perinephric fat which extends into the left posterior pararenal fat. 6. The bowel gas pattern is nonobstructive. 7. There is a large amount of stool throughout the colon which can be associated with constipation. 8. There is a large stool ball at the rectosigmoid junction which can be associated with an impaction. 9. There is a Armendariz catheter within the collapsed urinary bladder. There are gas shadows within the urinary bladder related to the Armendariz catheter. There is circumferential thickening of the wall the urinary bladder in part related to nondistention. Correlation should be made with a urinalysis to exclude a cystitis. 10. There are sclerotic foci scattered throughout the imaged osseous structures highly suspicious for blastic metastatic disease. 11. This examination is extremely limited to evaluate for lymphadenopathy. There are a couple iliac chain lymph nodes measuring 1.6 x 1.1 cm on the right and 1.3 x 1.0 cm on the left. There is a 2.6 x 2.0 cm density within the left para-aortic region    (series 2 image 32) which is felt to represent bowel. **This report has been created using voice recognition software. It may contain minor errors which are inherent in voice recognition technology. **      Final report electronically signed by Dr. Zavala Dates on 1/22/2018 9:34 AM      US RENAL COMPLETE   Final Result   1. Mild to moderate right hydronephrosis. There may be mild right hydroureter as well. 2.  Moderate to severe left hydronephrosis with mild to moderate left hydroureter the proximal ureter. 3.  Cholelithiasis. 4.  Right pleural effusion. **This report has been created using voice recognition software. PM

## 2018-01-25 ENCOUNTER — TELEPHONE (OUTPATIENT)
Dept: FAMILY MEDICINE CLINIC | Age: 83
End: 2018-01-25

## 2018-01-25 NOTE — TELEPHONE ENCOUNTER
[facility] Initial Follow Up Call    Call within 2 business days of discharge: Yes    Patient: Jett Brandon Patient : 3/20/1933   MRN: [id number]  Reason for Admission: There are no discharge diagnoses documented for the most recent discharge. Discharge Date: 18 RARS: Mari @YASIR([id number])@     Spoke with:  Called and pt has no mail box set up    Facility: [facility]    Non-face-to-face services provided:       Follow Up  Future Appointments  Date Time Provider Abraham Bailey   2018 1:30 PM Gasper Hadley DO [address] [place] [address]

## 2018-01-26 ENCOUNTER — TELEPHONE (OUTPATIENT)
Dept: FAMILY MEDICINE CLINIC | Age: 83
End: 2018-01-26

## 2018-01-26 NOTE — TELEPHONE ENCOUNTER
[facility] Initial Follow Up Call    Call within 2 business days of discharge: Yes    Patient: Rocco Gutierrez Patient : 3/20/1933   MRN: [id number]  Reason for Admission: There are no discharge diagnoses documented for the most recent discharge. Discharge Date: 18 RARS: Mari MON([id number])@     Spoke with: pt/dtg       Facility: [unfilled]    Non-face-to-face services provided:    Have the medications prescribed at discharge been filled? no  Does the patient understand what the medications are for? n/a  Has the patient experienced any new symptoms or have previous symptoms worsened? same  Is the patient experiencing any pain? yes  If yes, is the pain well controlled? yes  We want to be sure the patient has the best recovery possible. Does the patient understand all the discharge instructions? Pt sent home on [facility]  Did someone talk to the patient and/or family about the patient needs prior to being discharged? yes  Did the patient's doctor communicate well?  Yes  Did the patient's nurse communicate well? yes  Was the patient satisfied with the services and care received at [address] [place] [address]? yes        Follow Up  Future Appointments  Date Time Provider Abraham Bailey   2018 1:30 PM Marco Rodriguez DO [address] [facility]

## 2018-04-11 PROBLEM — E86.0 DEHYDRATION: Status: RESOLVED | Noted: 2018-01-20 | Resolved: 2018-04-11
